# Patient Record
Sex: FEMALE | Race: WHITE | Employment: UNEMPLOYED | URBAN - METROPOLITAN AREA
[De-identification: names, ages, dates, MRNs, and addresses within clinical notes are randomized per-mention and may not be internally consistent; named-entity substitution may affect disease eponyms.]

---

## 2017-03-31 ENCOUNTER — APPOINTMENT (OUTPATIENT)
Dept: GENERAL RADIOLOGY | Facility: HOSPITAL | Age: 57
End: 2017-03-31
Attending: EMERGENCY MEDICINE
Payer: MEDICAID

## 2017-03-31 ENCOUNTER — HOSPITAL ENCOUNTER (EMERGENCY)
Facility: HOSPITAL | Age: 57
Discharge: STILL ADMITTED OR ADMINISTRATIVE | End: 2017-04-01
Attending: EMERGENCY MEDICINE
Payer: MEDICAID

## 2017-03-31 ENCOUNTER — APPOINTMENT (OUTPATIENT)
Dept: CT IMAGING | Facility: HOSPITAL | Age: 57
End: 2017-03-31
Attending: EMERGENCY MEDICINE
Payer: MEDICAID

## 2017-03-31 DIAGNOSIS — R50.9 FEVER, UNSPECIFIED FEVER CAUSE: Primary | ICD-10-CM

## 2017-03-31 DIAGNOSIS — D64.9 ANEMIA, UNSPECIFIED TYPE: ICD-10-CM

## 2017-03-31 DIAGNOSIS — E86.0 DEHYDRATION: ICD-10-CM

## 2017-03-31 PROCEDURE — 96374 THER/PROPH/DIAG INJ IV PUSH: CPT

## 2017-03-31 PROCEDURE — 99285 EMERGENCY DEPT VISIT HI MDM: CPT

## 2017-03-31 PROCEDURE — 96361 HYDRATE IV INFUSION ADD-ON: CPT

## 2017-03-31 PROCEDURE — 82272 OCCULT BLD FECES 1-3 TESTS: CPT

## 2017-03-31 PROCEDURE — 93005 ELECTROCARDIOGRAM TRACING: CPT

## 2017-03-31 PROCEDURE — 80053 COMPREHEN METABOLIC PANEL: CPT | Performed by: EMERGENCY MEDICINE

## 2017-03-31 PROCEDURE — 74177 CT ABD & PELVIS W/CONTRAST: CPT

## 2017-03-31 PROCEDURE — 83690 ASSAY OF LIPASE: CPT | Performed by: EMERGENCY MEDICINE

## 2017-03-31 PROCEDURE — 85025 COMPLETE CBC W/AUTO DIFF WBC: CPT | Performed by: EMERGENCY MEDICINE

## 2017-03-31 PROCEDURE — 87040 BLOOD CULTURE FOR BACTERIA: CPT | Performed by: EMERGENCY MEDICINE

## 2017-03-31 PROCEDURE — 93010 ELECTROCARDIOGRAM REPORT: CPT

## 2017-03-31 PROCEDURE — 36415 COLL VENOUS BLD VENIPUNCTURE: CPT

## 2017-03-31 PROCEDURE — 71260 CT THORAX DX C+: CPT

## 2017-03-31 PROCEDURE — 71010 XR CHEST AP PORTABLE  (CPT=71010): CPT

## 2017-03-31 PROCEDURE — 83605 ASSAY OF LACTIC ACID: CPT | Performed by: EMERGENCY MEDICINE

## 2017-03-31 RX ORDER — METHADONE HYDROCHLORIDE 10 MG/1
60 TABLET ORAL NIGHTLY
Status: ON HOLD | COMMUNITY
End: 2019-08-02

## 2017-03-31 RX ORDER — TIZANIDINE 4 MG/1
8 TABLET ORAL 3 TIMES DAILY
COMMUNITY

## 2017-03-31 RX ORDER — AMITRIPTYLINE HYDROCHLORIDE 150 MG/1
150 TABLET, FILM COATED ORAL NIGHTLY
COMMUNITY

## 2017-04-01 VITALS
RESPIRATION RATE: 16 BRPM | WEIGHT: 99 LBS | TEMPERATURE: 99 F | BODY MASS INDEX: 22.91 KG/M2 | SYSTOLIC BLOOD PRESSURE: 140 MMHG | HEIGHT: 55 IN | HEART RATE: 85 BPM | OXYGEN SATURATION: 97 % | DIASTOLIC BLOOD PRESSURE: 84 MMHG

## 2017-04-01 PROCEDURE — 87999 UNLISTED MICROBIOLOGY PX: CPT

## 2017-04-01 PROCEDURE — 87086 URINE CULTURE/COLONY COUNT: CPT | Performed by: EMERGENCY MEDICINE

## 2017-04-01 PROCEDURE — 87486 CHLMYD PNEUM DNA AMP PROBE: CPT | Performed by: EMERGENCY MEDICINE

## 2017-04-01 PROCEDURE — 87798 DETECT AGENT NOS DNA AMP: CPT | Performed by: EMERGENCY MEDICINE

## 2017-04-01 PROCEDURE — C9113 INJ PANTOPRAZOLE SODIUM, VIA: HCPCS | Performed by: EMERGENCY MEDICINE

## 2017-04-01 PROCEDURE — 87581 M.PNEUMON DNA AMP PROBE: CPT | Performed by: EMERGENCY MEDICINE

## 2017-04-01 PROCEDURE — 83605 ASSAY OF LACTIC ACID: CPT | Performed by: EMERGENCY MEDICINE

## 2017-04-01 PROCEDURE — 81001 URINALYSIS AUTO W/SCOPE: CPT | Performed by: EMERGENCY MEDICINE

## 2017-04-01 PROCEDURE — 87633 RESP VIRUS 12-25 TARGETS: CPT | Performed by: EMERGENCY MEDICINE

## 2017-04-01 RX ORDER — ACETAMINOPHEN 160 MG/5ML
15 SOLUTION ORAL ONCE
Status: COMPLETED | OUTPATIENT
Start: 2017-04-01 | End: 2017-04-01

## 2017-04-01 NOTE — ED NOTES
Aroldo Carlson called back from [employer]. Wanted to update us that the original transport team would not be able to come. New ETA of 30-45 min.

## 2017-04-01 NOTE — ED NOTES
Report called to anaid of [facility]. Pt going to room 426.

## 2017-04-01 NOTE — ED NOTES
Spoke with MTOLIVIER. Trip# [id number]. Spoke with Ruthie Buchanan at [facility].  ETA 10-15 min

## 2017-04-01 NOTE — ED NOTES
Patient updated with plan of care. Given orange juice upon request. Transportation to [facility] to be here in 10-15 minutes.

## 2017-04-01 NOTE — ED INITIAL ASSESSMENT (HPI)
Pt reports falling from her ottoman a few hours ago. Pt denies hitting her head. No LOC. Pt reports having a low blood count and fever since yesterday. She states her doctor wanted her to be seen.  Pt is awake, alert, and oriented and denies any pain at [facility]

## 2018-01-23 ENCOUNTER — APPOINTMENT (OUTPATIENT)
Dept: CT IMAGING | Facility: HOSPITAL | Age: 58
End: 2018-01-23
Attending: EMERGENCY MEDICINE
Payer: MEDICAID

## 2018-01-23 ENCOUNTER — HOSPITAL ENCOUNTER (EMERGENCY)
Facility: HOSPITAL | Age: 58
Discharge: HOME | End: 2018-01-23
Attending: EMERGENCY MEDICINE
Payer: MEDICAID

## 2018-01-23 VITALS
RESPIRATION RATE: 18 BRPM | DIASTOLIC BLOOD PRESSURE: 47 MMHG | TEMPERATURE: 98 F | OXYGEN SATURATION: 97 % | SYSTOLIC BLOOD PRESSURE: 85 MMHG | HEART RATE: 103 BPM
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
## 2018-01-23 DIAGNOSIS — F11.29 OPIOID DEPENDENCE WITH OPIOID-INDUCED DISORDER (HCC): ICD-10-CM

## 2018-01-23 LAB
ALBUMIN SERPL-MCNC: 3.5 G/DL (ref 3.5–4.8)
ALP LIVER SERPL-CCNC: 109 U/L (ref 46–118)
ALT SERPL-CCNC: 14 U/L (ref 14–54)
AST SERPL-CCNC: 21 U/L (ref 15–41)
BASOPHILS # BLD AUTO: 0.06 X10(3) UL (ref 0–0.1)
BASOPHILS NFR BLD AUTO: 0.8 %
BILIRUB SERPL-MCNC: 0.2 MG/DL (ref 0.1–2)
BUN BLD-MCNC: 27 MG/DL (ref 8–20)
CALCIUM BLD-MCNC: 9.5 MG/DL (ref 8.3–10.3)
CHLORIDE: 101 MMOL/L (ref 101–111)
CO2: 25 MMOL/L (ref 22–32)
CREAT BLD-MCNC: 0.52 MG/DL (ref 0.55–1.02)
EOSINOPHIL # BLD AUTO: 0.42 X10(3) UL (ref 0–0.3)
EOSINOPHIL NFR BLD AUTO: 5.6 %
ERYTHROCYTE [DISTWIDTH] IN BLOOD BY AUTOMATED COUNT: 14.3 % (ref 11.5–16)
GLUCOSE BLD-MCNC: 69 MG/DL (ref 70–99)
HCT VFR BLD AUTO: 40.7 % (ref 34–50)
HGB BLD-MCNC: 13.4 G/DL (ref 12–16)
IMMATURE GRANULOCYTE COUNT: 0.04 X10(3) UL (ref 0–1)
IMMATURE GRANULOCYTE RATIO %: 0.5 %
LYMPHOCYTES # BLD AUTO: 2.4 X10(3) UL (ref 0.9–4)
LYMPHOCYTES NFR BLD AUTO: 31.9 %
M PROTEIN MFR SERPL ELPH: 7.5 G/DL (ref 6.1–8.3)
MCH RBC QN AUTO: 30.7 PG (ref 27–33.2)
MCHC RBC AUTO-ENTMCNC: 32.9 G/DL (ref 31–37)
MCV RBC AUTO: 93.3 FL (ref 81–100)
MONOCYTES # BLD AUTO: 0.36 X10(3) UL (ref 0.1–0.6)
MONOCYTES NFR BLD AUTO: 4.8 %
NEUTROPHIL ABS PRELIM: 4.25 X10 (3) UL (ref 1.3–6.7)
NEUTROPHILS # BLD AUTO: 4.25 X10(3) UL (ref 1.3–6.7)
NEUTROPHILS NFR BLD AUTO: 56.4 %
PLATELET # BLD AUTO: 245 10(3)UL (ref 150–450)
POTASSIUM SERPL-SCNC: 3.7 MMOL/L (ref 3.6–5.1)
RBC # BLD AUTO: 4.36 X10(6)UL (ref 3.8–5.1)
RED CELL DISTRIBUTION WIDTH-SD: 48.8 FL (ref 35.1–46.3)
SODIUM SERPL-SCNC: 136 MMOL/L (ref 136–144)
WBC # BLD AUTO: 7.5 X10(3) UL (ref 4–13)

## 2018-01-23 PROCEDURE — 85025 COMPLETE CBC W/AUTO DIFF WBC: CPT | Performed by: EMERGENCY MEDICINE

## 2018-01-23 PROCEDURE — 96360 HYDRATION IV INFUSION INIT: CPT

## 2018-01-23 PROCEDURE — 80053 COMPREHEN METABOLIC PANEL: CPT | Performed by: EMERGENCY MEDICINE

## 2018-01-23 PROCEDURE — 70450 CT HEAD/BRAIN W/O DYE: CPT | Performed by: EMERGENCY MEDICINE

## 2018-01-23 PROCEDURE — 99284 EMERGENCY DEPT VISIT MOD MDM: CPT

## 2018-01-23 PROCEDURE — 96361 HYDRATE IV INFUSION ADD-ON: CPT

## 2018-01-23 RX ORDER — GABAPENTIN 100 MG/1
200 CAPSULE ORAL 3 TIMES DAILY
Status: ON HOLD | COMMUNITY
End: 2019-08-02

## 2018-01-23 RX ORDER — FENTANYL 100 UG/H
1 PATCH TRANSDERMAL
Status: ON HOLD | COMMUNITY
End: 2019-08-09

## 2018-01-23 NOTE — ED PROVIDER NOTES
Patient Seen in: [facility] Emergency Department    History   Patient presents with:  Fall (musculoskeletal, neurologic)    Stated Complaint: fall, head and neck pain    HPI    Patient is a 59-year-old woman who comes by paramedics after a fall.   Pa cervical spine tenderness  Lungs: No tachypnea. Lungs clear to auscultation bilaterally without rales/rhonchi. Equal breath sounds bilaterally  Cardiac: No tachycardia. No murmurs. Regular rate and rhythm. Abdomen: Soft and nontender throughout.   No r evaluate. Labs reviewed. Patient is dehydrated here. She was hydrated with normal saline. She is given a tray. Patient would prefer to stay at home. Says she has helped though the help has been the last 2 days. Talked to the son.   Will have case m

## 2018-01-23 NOTE — ED NOTES
ER Phys confirmed Pt safe for for D/C, called Pt's son Rudi Sommer, who advised he would come  Pt from ER. ER Phys made aware of same.    Ordered Pt food tray

## 2018-01-23 NOTE — ED INITIAL ASSESSMENT (HPI)
Patient arrives via EMS after fall. Patient states she lost her balance and hit the back of her head. No LOC, not on blood thinners. Patient refused c collar from medics.  Patient also has two 100 mcg fentanyl patches in place, also taking methadone but ran

## 2018-01-23 NOTE — ED NOTES
ER Phys spoke to Pt son regarding assisted living care options for Pt. Pt son stated he believed PT would benefit from assisted living. ER Phys spoke to Pt who stated she would be willing to go to assisted living if her insurance allowed it.

## 2018-01-24 NOTE — ED NOTES
Pt ate half her meal tray, half her hamburger, both micki chip cookies, all of her milk and some crackers

## 2018-01-24 NOTE — ED NOTES
Please note that case management made aware of Pt needing assistance finding assisted living; ER RN left message on their private voice mail

## 2019-03-19 ENCOUNTER — APPOINTMENT (OUTPATIENT)
Dept: CT IMAGING | Facility: HOSPITAL | Age: 59
End: 2019-03-19
Attending: EMERGENCY MEDICINE
Payer: MEDICAID

## 2019-03-19 ENCOUNTER — HOSPITAL ENCOUNTER (EMERGENCY)
Facility: HOSPITAL | Age: 59
Discharge: HOME | End: 2019-03-19
Attending: EMERGENCY MEDICINE
Payer: MEDICAID

## 2019-03-19 VITALS
HEIGHT: 55 IN | OXYGEN SATURATION: 100 % | SYSTOLIC BLOOD PRESSURE: 128 MMHG | TEMPERATURE: 97 F | BODY MASS INDEX: 22.95 KG/M2 | WEIGHT: 99.19 LBS | RESPIRATION RATE: 13 BRPM | HEART RATE: 78 BPM | DIASTOLIC BLOOD PRESSURE: 84 MMHG
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
## 2019-03-19 LAB
ALBUMIN SERPL-MCNC: 3.5 G/DL (ref 3.4–5)
ALBUMIN/GLOB SERPL: 1 {RATIO} (ref 1–2)
ALP LIVER SERPL-CCNC: 106 U/L (ref 46–118)
ALT SERPL-CCNC: 16 U/L (ref 13–56)
ANION GAP SERPL CALC-SCNC: 3 MMOL/L (ref 0–18)
APTT PPP: 31.2 SECONDS (ref 26.1–34.6)
AST SERPL-CCNC: 19 U/L (ref 15–37)
ATRIAL RATE: 68 BPM
BASOPHILS # BLD AUTO: 0.04 X10(3) UL (ref 0–0.2)
BASOPHILS NFR BLD AUTO: 0.7 %
BILIRUB SERPL-MCNC: 0.2 MG/DL (ref 0.1–2)
BILIRUB UR QL STRIP.AUTO: NEGATIVE
BUN BLD-MCNC: 9 MG/DL (ref 7–18)
BUN/CREAT SERPL: 25.7 (ref 10–20)
CALCIUM BLD-MCNC: 9.6 MG/DL (ref 8.5–10.1)
CHLORIDE SERPL-SCNC: 107 MMOL/L (ref 98–107)
CLARITY UR REFRACT.AUTO: CLEAR
CO2 SERPL-SCNC: 30 MMOL/L (ref 21–32)
COLOR UR AUTO: YELLOW
CREAT BLD-MCNC: 0.35 MG/DL (ref 0.55–1.02)
DEPRECATED RDW RBC AUTO: 46.9 FL (ref 35.1–46.3)
EOSINOPHIL # BLD AUTO: 0.46 X10(3) UL (ref 0–0.7)
EOSINOPHIL NFR BLD AUTO: 7.5 %
ERYTHROCYTE [DISTWIDTH] IN BLOOD BY AUTOMATED COUNT: 13.8 % (ref 11–15)
GLOBULIN PLAS-MCNC: 3.5 G/DL (ref 2.8–4.4)
GLUCOSE BLD-MCNC: 89 MG/DL (ref 70–99)
GLUCOSE UR STRIP.AUTO-MCNC: NEGATIVE MG/DL
HCT VFR BLD AUTO: 38.7 % (ref 35–48)
HGB BLD-MCNC: 13 G/DL (ref 12–16)
IMM GRANULOCYTES # BLD AUTO: 0.03 X10(3) UL (ref 0–1)
IMM GRANULOCYTES NFR BLD: 0.5 %
INR BLD: 0.91 (ref 0.9–1.1)
KETONES UR STRIP.AUTO-MCNC: NEGATIVE MG/DL
LEUKOCYTE ESTERASE UR QL STRIP.AUTO: NEGATIVE
LYMPHOCYTES # BLD AUTO: 1.51 X10(3) UL (ref 1–4)
LYMPHOCYTES NFR BLD AUTO: 24.6 %
M PROTEIN MFR SERPL ELPH: 7 G/DL (ref 6.4–8.2)
MCH RBC QN AUTO: 31.5 PG (ref 26–34)
MCHC RBC AUTO-ENTMCNC: 33.6 G/DL (ref 31–37)
MCV RBC AUTO: 93.7 FL (ref 80–100)
MONOCYTES # BLD AUTO: 0.38 X10(3) UL (ref 0.1–1)
MONOCYTES NFR BLD AUTO: 6.2 %
NEUTROPHILS # BLD AUTO: 3.71 X10 (3) UL (ref 1.5–7.7)
NEUTROPHILS # BLD AUTO: 3.71 X10(3) UL (ref 1.5–7.7)
NEUTROPHILS NFR BLD AUTO: 60.5 %
NITRITE UR QL STRIP.AUTO: NEGATIVE
OSMOLALITY SERPL CALC.SUM OF ELEC: 288 MOSM/KG (ref 275–295)
P AXIS: 77 DEGREES
P-R INTERVAL: 192 MS
PH UR STRIP.AUTO: 6 [PH] (ref 4.5–8)
PLATELET # BLD AUTO: 172 10(3)UL (ref 150–450)
POTASSIUM SERPL-SCNC: 3.8 MMOL/L (ref 3.5–5.1)
PROT UR STRIP.AUTO-MCNC: NEGATIVE MG/DL
PSA SERPL DL<=0.01 NG/ML-MCNC: 12.6 SECONDS (ref 12.5–14.7)
Q-T INTERVAL: 432 MS
QRS DURATION: 98 MS
QTC CALCULATION (BEZET): 459 MS
R AXIS: 38 DEGREES
RBC # BLD AUTO: 4.13 X10(6)UL (ref 3.8–5.3)
RBC UR QL AUTO: NEGATIVE
SODIUM SERPL-SCNC: 140 MMOL/L (ref 136–145)
SP GR UR STRIP.AUTO: 1.01 (ref 1–1.03)
T AXIS: 62 DEGREES
TROPONIN I SERPL-MCNC: <0.045 NG/ML (ref ?–0.04)
UROBILINOGEN UR STRIP.AUTO-MCNC: <2 MG/DL
VENTRICULAR RATE: 68 BPM
WBC # BLD AUTO: 6.1 X10(3) UL (ref 4–11)

## 2019-03-19 PROCEDURE — 99285 EMERGENCY DEPT VISIT HI MDM: CPT | Performed by: EMERGENCY MEDICINE

## 2019-03-19 PROCEDURE — 70450 CT HEAD/BRAIN W/O DYE: CPT | Performed by: EMERGENCY MEDICINE

## 2019-03-19 PROCEDURE — 93005 ELECTROCARDIOGRAM TRACING: CPT

## 2019-03-19 PROCEDURE — 96360 HYDRATION IV INFUSION INIT: CPT | Performed by: EMERGENCY MEDICINE

## 2019-03-19 PROCEDURE — 85730 THROMBOPLASTIN TIME PARTIAL: CPT | Performed by: EMERGENCY MEDICINE

## 2019-03-19 PROCEDURE — 80053 COMPREHEN METABOLIC PANEL: CPT | Performed by: EMERGENCY MEDICINE

## 2019-03-19 PROCEDURE — 84484 ASSAY OF TROPONIN QUANT: CPT | Performed by: EMERGENCY MEDICINE

## 2019-03-19 PROCEDURE — 85025 COMPLETE CBC W/AUTO DIFF WBC: CPT | Performed by: EMERGENCY MEDICINE

## 2019-03-19 PROCEDURE — 81003 URINALYSIS AUTO W/O SCOPE: CPT | Performed by: EMERGENCY MEDICINE

## 2019-03-19 PROCEDURE — 96361 HYDRATE IV INFUSION ADD-ON: CPT | Performed by: EMERGENCY MEDICINE

## 2019-03-19 PROCEDURE — 85610 PROTHROMBIN TIME: CPT | Performed by: EMERGENCY MEDICINE

## 2019-03-19 PROCEDURE — 93010 ELECTROCARDIOGRAM REPORT: CPT | Performed by: EMERGENCY MEDICINE

## 2019-03-19 RX ORDER — SODIUM CHLORIDE 9 MG/ML
125 INJECTION, SOLUTION INTRAVENOUS CONTINUOUS
Status: DISCONTINUED | OUTPATIENT
Start: 2019-03-19 | End: 2019-03-19

## 2019-03-19 RX ORDER — SENNOSIDES 8.6 MG
8.6 TABLET ORAL 2 TIMES DAILY
COMMUNITY

## 2019-03-19 RX ORDER — METOPROLOL SUCCINATE 25 MG/1
25 TABLET, EXTENDED RELEASE ORAL DAILY
Status: ON HOLD | COMMUNITY
End: 2019-08-06

## 2019-03-19 NOTE — ED PROVIDER NOTES
Patient Seen in: [facility] Emergency Department    History   Patient presents with:  Fall (musculoskeletal, neurologic)    Stated Complaint: fall    DILLAN Cisneros Amish is a pleasant 42-year-old female coming with complaints of fall.   She has had 4 falls within normal limits   PROTHROMBIN TIME (PT) - Normal   PTT, ACTIVATED - Normal   TROPONIN I - Normal   CBC WITH DIFFERENTIAL WITH PLATELET    Narrative: The following orders were created for panel order CBC WITH DIFFERENTIAL WITH PLATELET.   Procedure

## 2019-03-19 NOTE — ED INITIAL ASSESSMENT (HPI)
Pt has been having multiple fall,  Medics called 4 times yesterday for assistance. Soial Shan last night and has been on floor since.   Pain in rt leg when trying to ambulate

## 2019-08-01 ENCOUNTER — HOSPITAL ENCOUNTER (INPATIENT)
Facility: HOSPITAL | Age: 59
LOS: 2 days | Discharge: HOME HEALTH | DRG: 083 | End: 2019-08-03
Attending: EMERGENCY MEDICINE | Admitting: HOSPITALIST
Payer: MEDICAID

## 2019-08-01 ENCOUNTER — APPOINTMENT (OUTPATIENT)
Dept: CT IMAGING | Facility: HOSPITAL | Age: 59
DRG: 083 | End: 2019-08-01
Attending: NURSE PRACTITIONER
Payer: MEDICAID

## 2019-08-01 ENCOUNTER — APPOINTMENT (OUTPATIENT)
Dept: CT IMAGING | Facility: HOSPITAL | Age: 59
DRG: 083 | End: 2019-08-01
Attending: EMERGENCY MEDICINE
Payer: MEDICAID

## 2019-08-01 DIAGNOSIS — M08.00 JUVENILE RHEUMATOID ARTHRITIS (HCC): ICD-10-CM

## 2019-08-01 DIAGNOSIS — R53.1 WEAKNESS GENERALIZED: Primary | ICD-10-CM

## 2019-08-01 DIAGNOSIS — E34.3 DWARFISM: ICD-10-CM

## 2019-08-01 DIAGNOSIS — S06.5X9A ACUTE SUBDURAL HEMATOMA (HCC): ICD-10-CM

## 2019-08-01 DIAGNOSIS — R26.81 GAIT INSTABILITY: ICD-10-CM

## 2019-08-01 DIAGNOSIS — E87.6 HYPOKALEMIA: ICD-10-CM

## 2019-08-01 DIAGNOSIS — R29.6 FREQUENT FALLS: ICD-10-CM

## 2019-08-01 PROBLEM — I10 ESSENTIAL HYPERTENSION: Status: ACTIVE | Noted: 2019-08-01

## 2019-08-01 LAB
ALBUMIN SERPL-MCNC: 3.7 G/DL (ref 3.4–5)
ALBUMIN/GLOB SERPL: 1.1 {RATIO} (ref 1–2)
ALP LIVER SERPL-CCNC: 101 U/L (ref 46–118)
ALT SERPL-CCNC: 14 U/L (ref 13–56)
AMPHET UR QL SCN: NEGATIVE
ANION GAP SERPL CALC-SCNC: 2 MMOL/L (ref 0–18)
ANION GAP SERPL CALC-SCNC: 6 MMOL/L (ref 0–18)
APTT PPP: 28.6 SECONDS (ref 25.4–36.1)
AST SERPL-CCNC: 19 U/L (ref 15–37)
ATRIAL RATE: 75 BPM
BARBITURATES UR QL SCN: NEGATIVE
BASOPHILS # BLD AUTO: 0.05 X10(3) UL (ref 0–0.2)
BASOPHILS # BLD AUTO: 0.07 X10(3) UL (ref 0–0.2)
BASOPHILS NFR BLD AUTO: 0.7 %
BASOPHILS NFR BLD AUTO: 1 %
BENZODIAZ UR QL SCN: NEGATIVE
BILIRUB SERPL-MCNC: 0.5 MG/DL (ref 0.1–2)
BILIRUB UR QL STRIP.AUTO: NEGATIVE
BUN BLD-MCNC: 7 MG/DL (ref 7–18)
BUN BLD-MCNC: 8 MG/DL (ref 7–18)
BUN/CREAT SERPL: 17.4 (ref 10–20)
BUN/CREAT SERPL: 19.4 (ref 10–20)
CALCIUM BLD-MCNC: 9 MG/DL (ref 8.5–10.1)
CALCIUM BLD-MCNC: 9.5 MG/DL (ref 8.5–10.1)
CANNABINOIDS UR QL SCN: NEGATIVE
CHLORIDE SERPL-SCNC: 105 MMOL/L (ref 98–112)
CHLORIDE SERPL-SCNC: 108 MMOL/L (ref 98–112)
CHOLEST SMN-MCNC: 195 MG/DL (ref ?–200)
CLARITY UR REFRACT.AUTO: CLEAR
CO2 SERPL-SCNC: 29 MMOL/L (ref 21–32)
CO2 SERPL-SCNC: 33 MMOL/L (ref 21–32)
COCAINE UR QL: NEGATIVE
COLOR UR AUTO: YELLOW
CREAT BLD-MCNC: 0.36 MG/DL (ref 0.55–1.02)
CREAT BLD-MCNC: 0.46 MG/DL (ref 0.55–1.02)
CREAT UR-SCNC: 42.4 MG/DL
DEPRECATED RDW RBC AUTO: 49.4 FL (ref 35.1–46.3)
DEPRECATED RDW RBC AUTO: 49.6 FL (ref 35.1–46.3)
EOSINOPHIL # BLD AUTO: 0.53 X10(3) UL (ref 0–0.7)
EOSINOPHIL # BLD AUTO: 0.58 X10(3) UL (ref 0–0.7)
EOSINOPHIL NFR BLD AUTO: 7.6 %
EOSINOPHIL NFR BLD AUTO: 7.8 %
ERYTHROCYTE [DISTWIDTH] IN BLOOD BY AUTOMATED COUNT: 13.9 % (ref 11–15)
ERYTHROCYTE [DISTWIDTH] IN BLOOD BY AUTOMATED COUNT: 14 % (ref 11–15)
EST. AVERAGE GLUCOSE BLD GHB EST-MCNC: 117 MG/DL (ref 68–126)
ETHANOL UR-MCNC: NEGATIVE MG/DL
GLOBULIN PLAS-MCNC: 3.4 G/DL (ref 2.8–4.4)
GLUCOSE BLD-MCNC: 77 MG/DL (ref 70–99)
GLUCOSE BLD-MCNC: 95 MG/DL (ref 70–99)
GLUCOSE UR STRIP.AUTO-MCNC: NEGATIVE MG/DL
HBA1C MFR BLD HPLC: 5.7 % (ref ?–5.7)
HCT VFR BLD AUTO: 38.3 % (ref 35–48)
HCT VFR BLD AUTO: 40.2 % (ref 35–48)
HDLC SERPL-MCNC: 61 MG/DL (ref 40–59)
HGB BLD-MCNC: 12.3 G/DL (ref 12–16)
HGB BLD-MCNC: 13.1 G/DL (ref 12–16)
IMM GRANULOCYTES # BLD AUTO: 0.02 X10(3) UL (ref 0–1)
IMM GRANULOCYTES # BLD AUTO: 0.02 X10(3) UL (ref 0–1)
IMM GRANULOCYTES NFR BLD: 0.3 %
IMM GRANULOCYTES NFR BLD: 0.3 %
INR BLD: 1.04 (ref 0.9–1.1)
KETONES UR STRIP.AUTO-MCNC: NEGATIVE MG/DL
LDLC SERPL CALC-MCNC: 116 MG/DL (ref ?–100)
LEUKOCYTE ESTERASE UR QL STRIP.AUTO: NEGATIVE
LYMPHOCYTES # BLD AUTO: 1.74 X10(3) UL (ref 1–4)
LYMPHOCYTES # BLD AUTO: 2.02 X10(3) UL (ref 1–4)
LYMPHOCYTES NFR BLD AUTO: 25 %
LYMPHOCYTES NFR BLD AUTO: 27.2 %
M PROTEIN MFR SERPL ELPH: 7.1 G/DL (ref 6.4–8.2)
MCH RBC QN AUTO: 31 PG (ref 26–34)
MCH RBC QN AUTO: 31.4 PG (ref 26–34)
MCHC RBC AUTO-ENTMCNC: 32.1 G/DL (ref 31–37)
MCHC RBC AUTO-ENTMCNC: 32.6 G/DL (ref 31–37)
MCV RBC AUTO: 96.4 FL (ref 80–100)
MCV RBC AUTO: 96.5 FL (ref 80–100)
MONOCYTES # BLD AUTO: 0.39 X10(3) UL (ref 0.1–1)
MONOCYTES # BLD AUTO: 0.49 X10(3) UL (ref 0.1–1)
MONOCYTES NFR BLD AUTO: 5.6 %
MONOCYTES NFR BLD AUTO: 6.6 %
NEUTROPHILS # BLD AUTO: 4.22 X10 (3) UL (ref 1.5–7.7)
NEUTROPHILS # BLD AUTO: 4.22 X10(3) UL (ref 1.5–7.7)
NEUTROPHILS # BLD AUTO: 4.27 X10 (3) UL (ref 1.5–7.7)
NEUTROPHILS # BLD AUTO: 4.27 X10(3) UL (ref 1.5–7.7)
NEUTROPHILS NFR BLD AUTO: 57.4 %
NEUTROPHILS NFR BLD AUTO: 60.5 %
NITRITE UR QL STRIP.AUTO: NEGATIVE
NONHDLC SERPL-MCNC: 134 MG/DL (ref ?–130)
OPIATES UR QL SCN: NEGATIVE
OSMOLALITY SERPL CALC.SUM OF ELEC: 288 MOSM/KG (ref 275–295)
OSMOLALITY SERPL CALC.SUM OF ELEC: 293 MOSM/KG (ref 275–295)
P AXIS: 76 DEGREES
P-R INTERVAL: 186 MS
PCP UR QL SCN: NEGATIVE
PH UR STRIP.AUTO: 5 [PH] (ref 4.5–8)
PLATELET # BLD AUTO: 198 10(3)UL (ref 150–450)
PLATELET # BLD AUTO: 224 10(3)UL (ref 150–450)
POTASSIUM SERPL-SCNC: 3.1 MMOL/L (ref 3.5–5.1)
POTASSIUM SERPL-SCNC: 3.2 MMOL/L (ref 3.5–5.1)
POTASSIUM SERPL-SCNC: 3.8 MMOL/L (ref 3.5–5.1)
PROT UR STRIP.AUTO-MCNC: NEGATIVE MG/DL
PSA SERPL DL<=0.01 NG/ML-MCNC: 14 SECONDS (ref 12.5–14.7)
Q-T INTERVAL: 428 MS
QRS DURATION: 112 MS
QTC CALCULATION (BEZET): 477 MS
R AXIS: 8 DEGREES
RBC # BLD AUTO: 3.97 X10(6)UL (ref 3.8–5.3)
RBC # BLD AUTO: 4.17 X10(6)UL (ref 3.8–5.3)
RBC UR QL AUTO: NEGATIVE
SODIUM SERPL-SCNC: 140 MMOL/L (ref 136–145)
SODIUM SERPL-SCNC: 143 MMOL/L (ref 136–145)
SP GR UR STRIP.AUTO: 1.01 (ref 1–1.03)
T AXIS: 86 DEGREES
TRIGL SERPL-MCNC: 89 MG/DL (ref 30–149)
TROPONIN I SERPL-MCNC: <0.045 NG/ML (ref ?–0.04)
TSI SER-ACNC: 1.14 MIU/ML (ref 0.36–3.74)
UROBILINOGEN UR STRIP.AUTO-MCNC: <2 MG/DL
VENTRICULAR RATE: 75 BPM
VLDLC SERPL CALC-MCNC: 18 MG/DL (ref 0–30)
WBC # BLD AUTO: 7 X10(3) UL (ref 4–11)
WBC # BLD AUTO: 7.4 X10(3) UL (ref 4–11)

## 2019-08-01 PROCEDURE — 99221 1ST HOSP IP/OBS SF/LOW 40: CPT | Performed by: NEUROLOGICAL SURGERY

## 2019-08-01 PROCEDURE — 90792 PSYCH DIAG EVAL W/MED SRVCS: CPT | Performed by: OTHER

## 2019-08-01 PROCEDURE — 70450 CT HEAD/BRAIN W/O DYE: CPT | Performed by: NURSE PRACTITIONER

## 2019-08-01 PROCEDURE — 99291 CRITICAL CARE FIRST HOUR: CPT | Performed by: NURSE PRACTITIONER

## 2019-08-01 PROCEDURE — 70450 CT HEAD/BRAIN W/O DYE: CPT | Performed by: EMERGENCY MEDICINE

## 2019-08-01 PROCEDURE — 99223 1ST HOSP IP/OBS HIGH 75: CPT | Performed by: HOSPITALIST

## 2019-08-01 RX ORDER — LABETALOL HYDROCHLORIDE 5 MG/ML
10 INJECTION, SOLUTION INTRAVENOUS EVERY 10 MIN PRN
Status: DISCONTINUED | OUTPATIENT
Start: 2019-08-01 | End: 2019-08-01

## 2019-08-01 RX ORDER — METHADONE HYDROCHLORIDE 10 MG/1
50 TABLET ORAL 2 TIMES DAILY WITH MEALS
Status: ON HOLD | COMMUNITY
End: 2019-08-09

## 2019-08-01 RX ORDER — SODIUM PHOSPHATE, DIBASIC AND SODIUM PHOSPHATE, MONOBASIC 7; 19 G/133ML; G/133ML
1 ENEMA RECTAL ONCE AS NEEDED
Status: DISCONTINUED | OUTPATIENT
Start: 2019-08-01 | End: 2019-08-01

## 2019-08-01 RX ORDER — POTASSIUM CHLORIDE 14.9 MG/ML
20 INJECTION INTRAVENOUS ONCE
Status: DISCONTINUED | OUTPATIENT
Start: 2019-08-01 | End: 2019-08-03

## 2019-08-01 RX ORDER — LABETALOL HYDROCHLORIDE 5 MG/ML
10 INJECTION, SOLUTION INTRAVENOUS EVERY 10 MIN PRN
Status: DISCONTINUED | OUTPATIENT
Start: 2019-08-01 | End: 2019-08-03

## 2019-08-01 RX ORDER — MORPHINE SULFATE 4 MG/ML
1 INJECTION, SOLUTION INTRAMUSCULAR; INTRAVENOUS EVERY 2 HOUR PRN
Status: DISCONTINUED | OUTPATIENT
Start: 2019-08-01 | End: 2019-08-01

## 2019-08-01 RX ORDER — CALCITRIOL 0.5 UG/1
0.5 CAPSULE, LIQUID FILLED ORAL DAILY
COMMUNITY

## 2019-08-01 RX ORDER — OMEPRAZOLE 20 MG/1
20 CAPSULE, DELAYED RELEASE ORAL
COMMUNITY

## 2019-08-01 RX ORDER — LORAZEPAM 2 MG/ML
1 INJECTION INTRAMUSCULAR
Status: DISCONTINUED | OUTPATIENT
Start: 2019-08-01 | End: 2019-08-01

## 2019-08-01 RX ORDER — TIZANIDINE 4 MG/1
8 TABLET ORAL 3 TIMES DAILY PRN
Status: DISCONTINUED | OUTPATIENT
Start: 2019-08-01 | End: 2019-08-02

## 2019-08-01 RX ORDER — SODIUM CHLORIDE 9 MG/ML
INJECTION, SOLUTION INTRAVENOUS CONTINUOUS
Status: DISCONTINUED | OUTPATIENT
Start: 2019-08-01 | End: 2019-08-01

## 2019-08-01 RX ORDER — ONDANSETRON 2 MG/ML
4 INJECTION INTRAMUSCULAR; INTRAVENOUS EVERY 6 HOURS PRN
Status: DISCONTINUED | OUTPATIENT
Start: 2019-08-01 | End: 2019-08-03

## 2019-08-01 RX ORDER — ACETAMINOPHEN 650 MG/1
650 SUPPOSITORY RECTAL EVERY 4 HOURS PRN
Status: DISCONTINUED | OUTPATIENT
Start: 2019-08-01 | End: 2019-08-03

## 2019-08-01 RX ORDER — CALCITRIOL 0.25 UG/1
0.25 CAPSULE, LIQUID FILLED ORAL DAILY
Status: ON HOLD | COMMUNITY
End: 2019-08-06

## 2019-08-01 RX ORDER — SODIUM PHOSPHATE, DIBASIC AND SODIUM PHOSPHATE, MONOBASIC 7; 19 G/133ML; G/133ML
1 ENEMA RECTAL ONCE AS NEEDED
Status: DISCONTINUED | OUTPATIENT
Start: 2019-08-01 | End: 2019-08-03

## 2019-08-01 RX ORDER — SODIUM CHLORIDE 9 MG/ML
INJECTION, SOLUTION INTRAVENOUS CONTINUOUS
Status: DISCONTINUED | OUTPATIENT
Start: 2019-08-01 | End: 2019-08-03

## 2019-08-01 RX ORDER — POLYETHYLENE GLYCOL 3350 17 G/17G
17 POWDER, FOR SOLUTION ORAL DAILY PRN
Status: DISCONTINUED | OUTPATIENT
Start: 2019-08-01 | End: 2019-08-03

## 2019-08-01 RX ORDER — HYDROMORPHONE HYDROCHLORIDE 1 MG/ML
0.5 INJECTION, SOLUTION INTRAMUSCULAR; INTRAVENOUS; SUBCUTANEOUS EVERY 4 HOURS PRN
Status: DISCONTINUED | OUTPATIENT
Start: 2019-08-01 | End: 2019-08-01

## 2019-08-01 RX ORDER — ACETAMINOPHEN 325 MG/1
650 TABLET ORAL EVERY 4 HOURS PRN
Status: DISCONTINUED | OUTPATIENT
Start: 2019-08-01 | End: 2019-08-03

## 2019-08-01 RX ORDER — FENTANYL 50 UG/H
1 PATCH TRANSDERMAL
Status: DISCONTINUED | OUTPATIENT
Start: 2019-08-01 | End: 2019-08-01

## 2019-08-01 RX ORDER — PHENYLEPHRINE HCL IN 0.9% NACL 50MG/250ML
PLASTIC BAG, INJECTION (ML) INTRAVENOUS CONTINUOUS PRN
Status: DISCONTINUED | OUTPATIENT
Start: 2019-08-01 | End: 2019-08-01

## 2019-08-01 RX ORDER — POLYETHYLENE GLYCOL 3350 17 G/17G
17 POWDER, FOR SOLUTION ORAL DAILY PRN
Status: DISCONTINUED | OUTPATIENT
Start: 2019-08-01 | End: 2019-08-01

## 2019-08-01 RX ORDER — METOCLOPRAMIDE HYDROCHLORIDE 5 MG/ML
10 INJECTION INTRAMUSCULAR; INTRAVENOUS EVERY 8 HOURS PRN
Status: DISCONTINUED | OUTPATIENT
Start: 2019-08-01 | End: 2019-08-01

## 2019-08-01 RX ORDER — MORPHINE SULFATE 4 MG/ML
2 INJECTION, SOLUTION INTRAMUSCULAR; INTRAVENOUS EVERY 2 HOUR PRN
Status: DISCONTINUED | OUTPATIENT
Start: 2019-08-01 | End: 2019-08-01

## 2019-08-01 RX ORDER — METOCLOPRAMIDE HYDROCHLORIDE 5 MG/ML
10 INJECTION INTRAMUSCULAR; INTRAVENOUS EVERY 8 HOURS PRN
Status: DISCONTINUED | OUTPATIENT
Start: 2019-08-01 | End: 2019-08-03

## 2019-08-01 RX ORDER — ACETAMINOPHEN 325 MG/1
650 TABLET ORAL EVERY 4 HOURS PRN
Status: DISCONTINUED | OUTPATIENT
Start: 2019-08-01 | End: 2019-08-01

## 2019-08-01 RX ORDER — SENNOSIDES 8.6 MG
17.2 TABLET ORAL NIGHTLY
Status: DISCONTINUED | OUTPATIENT
Start: 2019-08-01 | End: 2019-08-03

## 2019-08-01 RX ORDER — FENTANYL 100 UG/H
1 PATCH TRANSDERMAL
Status: DISCONTINUED | OUTPATIENT
Start: 2019-08-01 | End: 2019-08-03

## 2019-08-01 RX ORDER — BISACODYL 10 MG
10 SUPPOSITORY, RECTAL RECTAL
Status: DISCONTINUED | OUTPATIENT
Start: 2019-08-01 | End: 2019-08-01

## 2019-08-01 RX ORDER — ACETAMINOPHEN 650 MG/1
650 SUPPOSITORY RECTAL EVERY 4 HOURS PRN
Status: DISCONTINUED | OUTPATIENT
Start: 2019-08-01 | End: 2019-08-01

## 2019-08-01 RX ORDER — ACETAMINOPHEN 325 MG/1
650 TABLET ORAL EVERY 6 HOURS PRN
Status: DISCONTINUED | OUTPATIENT
Start: 2019-08-01 | End: 2019-08-01

## 2019-08-01 RX ORDER — GABAPENTIN 100 MG/1
100 CAPSULE ORAL 3 TIMES DAILY
Status: DISCONTINUED | OUTPATIENT
Start: 2019-08-01 | End: 2019-08-03

## 2019-08-01 RX ORDER — METHADONE HYDROCHLORIDE 10 MG/1
50 TABLET ORAL EVERY MORNING
Status: ON HOLD | COMMUNITY
End: 2019-08-02

## 2019-08-01 RX ORDER — DIVALPROEX SODIUM 250 MG/1
250 TABLET, DELAYED RELEASE ORAL EVERY 12 HOURS SCHEDULED
Status: DISCONTINUED | OUTPATIENT
Start: 2019-08-01 | End: 2019-08-01

## 2019-08-01 RX ORDER — POTASSIUM CHLORIDE 20 MEQ/1
40 TABLET, EXTENDED RELEASE ORAL ONCE
Status: COMPLETED | OUTPATIENT
Start: 2019-08-01 | End: 2019-08-01

## 2019-08-01 RX ORDER — METHADONE HYDROCHLORIDE 10 MG/1
50 TABLET ORAL 2 TIMES DAILY
Status: DISCONTINUED | OUTPATIENT
Start: 2019-08-01 | End: 2019-08-03

## 2019-08-01 RX ORDER — METHADONE HYDROCHLORIDE 10 MG/1
60 TABLET ORAL NIGHTLY
Status: DISCONTINUED | OUTPATIENT
Start: 2019-08-01 | End: 2019-08-03

## 2019-08-01 RX ORDER — MORPHINE SULFATE 4 MG/ML
4 INJECTION, SOLUTION INTRAMUSCULAR; INTRAVENOUS EVERY 30 MIN PRN
Status: DISCONTINUED | OUTPATIENT
Start: 2019-08-01 | End: 2019-08-01

## 2019-08-01 RX ORDER — DOCUSATE SODIUM 100 MG/1
100 CAPSULE, LIQUID FILLED ORAL 2 TIMES DAILY
Status: DISCONTINUED | OUTPATIENT
Start: 2019-08-01 | End: 2019-08-03

## 2019-08-01 RX ORDER — SENNOSIDES 8.6 MG
17.2 TABLET ORAL NIGHTLY
Status: DISCONTINUED | OUTPATIENT
Start: 2019-08-01 | End: 2019-08-01

## 2019-08-01 RX ORDER — ONDANSETRON 2 MG/ML
4 INJECTION INTRAMUSCULAR; INTRAVENOUS EVERY 6 HOURS PRN
Status: DISCONTINUED | OUTPATIENT
Start: 2019-08-01 | End: 2019-08-01

## 2019-08-01 RX ORDER — FAMOTIDINE 20 MG/1
20 TABLET ORAL 2 TIMES DAILY
Status: DISCONTINUED | OUTPATIENT
Start: 2019-08-01 | End: 2019-08-01

## 2019-08-01 RX ORDER — ONDANSETRON 2 MG/ML
4 INJECTION INTRAMUSCULAR; INTRAVENOUS EVERY 4 HOURS PRN
Status: DISCONTINUED | OUTPATIENT
Start: 2019-08-01 | End: 2019-08-01

## 2019-08-01 RX ORDER — FAMOTIDINE 10 MG/ML
20 INJECTION, SOLUTION INTRAVENOUS 2 TIMES DAILY
Status: DISCONTINUED | OUTPATIENT
Start: 2019-08-01 | End: 2019-08-01

## 2019-08-01 RX ORDER — SODIUM CHLORIDE 9 MG/ML
1000 INJECTION, SOLUTION INTRAVENOUS ONCE
Status: COMPLETED | OUTPATIENT
Start: 2019-08-01 | End: 2019-08-01

## 2019-08-01 RX ORDER — BISACODYL 10 MG
10 SUPPOSITORY, RECTAL RECTAL
Status: DISCONTINUED | OUTPATIENT
Start: 2019-08-01 | End: 2019-08-03

## 2019-08-01 RX ORDER — LORAZEPAM 2 MG/ML
1 INJECTION INTRAMUSCULAR
Status: DISCONTINUED | OUTPATIENT
Start: 2019-08-01 | End: 2019-08-03

## 2019-08-01 RX ORDER — DOCUSATE SODIUM 100 MG/1
100 CAPSULE, LIQUID FILLED ORAL 2 TIMES DAILY
Status: DISCONTINUED | OUTPATIENT
Start: 2019-08-01 | End: 2019-08-01

## 2019-08-01 RX ORDER — AMITRIPTYLINE HYDROCHLORIDE 50 MG/1
150 TABLET, FILM COATED ORAL NIGHTLY
Status: DISCONTINUED | OUTPATIENT
Start: 2019-08-01 | End: 2019-08-03

## 2019-08-01 RX ORDER — FUROSEMIDE 20 MG/1
20 TABLET ORAL DAILY PRN
COMMUNITY

## 2019-08-01 NOTE — SLP NOTE
ADULT SWALLOWING EVALUATION    ASSESSMENT    ASSESSMENT/OVERALL IMPRESSION:  Order received per protocol; chart reviewed. Pt admitted with c/o falls.  Pt reported multiple falls at home with most recent one falling forward and hitting her face causing epist protocol    Problem List  Principal Problem:    Weakness generalized  Active Problems:    Dwarfism    Juvenile rheumatoid arthritis ([address]    Frequent falls    Gait instability    Hypokalemia    Acute subdural hematoma (HCC)    Essential hypertension      P Plan/Recommendations: Communication evaluation;Aspiration precautions  Number of Visits to Meet Established Goals: 1  Follow Up Needed: Yes  SLP Follow-up Date: 08/02/19    Thank you for your referral.   If you have any questions, please contact Benny Blandon

## 2019-08-01 NOTE — PHYSICAL THERAPY NOTE
PHYSICAL THERAPY EVALUATION - INPATIENT     Room Number: 9319/7263-E  Evaluation Date: 8/1/2019  Type of Evaluation: Initial  Physician Order: PT Eval and Treat    Presenting Problem: SDH  Reason for Therapy: Mobility Dysfunction and Discharge Planni stoop step to support tranfers)  Fall Risk: High fall risk    WEIGHT BEARING RESTRICTION  Weight Bearing Restriction: None                PAIN ASSESSMENT  Ratin  Location: Headache  Management Techniques:  Activity promotion;Repositioning    COGNITION bed?: A Lot   How much help from another person does the patient currently need. ..   -   Moving to and from a bed to a chair (including a wheelchair)?: A Lot   -   Need to walk in hospital room?: A Lot   -   Climbing 3-5 steps with a railing?: Total to pt's size and short stature, rn staff should be able to assist pt w/o significant issues. Exercise/Education Provided:  Bed mobility  Functional activity tolerated  Gait training  Transfer training    Patient End of Session: Up in chair;Needs met; Morgan training;Transfer training;Balance training  Rehab Potential : Good  Frequency (Obs): 5x/week  Number of Visits to Meet Established Goals: 5      CURRENT GOALS    Goal #1 Patient is able to demonstrate supine - sit EOB @ level: minimum assistance     Goal

## 2019-08-01 NOTE — PROGRESS NOTES
08/01/19 1445   Clinical Encounter Type   Visited With Patient   Routine Visit   (Responded to consult)   Continue Visiting   (Encouraged to call  anytime for further support)   Patient Spiritual Encounters   Spiritual Assessment Completed Yes

## 2019-08-01 NOTE — PLAN OF CARE
Patient received from ED at 0300. Patient is drowsy and oriented x4, follows all commands, PETERSON with baseline deficits due to RA, see neuro flowsheet for comprehensive assessments. VSS, NSR on tele. SBP maintained  per orders.  Patient scheduled for re

## 2019-08-01 NOTE — CM/SW NOTE
SW spoke to pt's son today. Pt is 60 yo female, with hx of falls at home. Pt has SDH. Pt lives alone and he is her paid caregiver 30 hours per week. Pt recs Acute [facility]- Alla consulted.     Unable to meet with pt this afternoon, Dr. Yadi Mott to see p

## 2019-08-01 NOTE — H&P
[facility]  History and Physical     Dione Brock Patient Status:  Emergency    1960 MRN [id number]   [address] Attending Jojo Agosto MD   Hosp Day # 0 PCP Chinmay Kennedy     Chief Complaint: AMS    Hi nightly. Disp:  Rfl:    Senna 8.6 MG Oral Tab Take 8.6 mg by mouth 2 (two) times daily. Disp:  Rfl:    Ferrous Gluconate (IRON 27 OR) Take by mouth. Disp:  Rfl:        Review of Systems:   A comprehensive 14 point review of systems was completed.     Pertin 2. Hypertension- Will continue metoprolol with parameters. 3. Juvenile rheumatoid arthritis- Will continue gabapetin. Will resume home pain med if OK with neurosurgery. 4.    Hypertension- Will continue metoprolol with parameters.   5.   Nicotine depen

## 2019-08-01 NOTE — PROGRESS NOTES
Called by ED  Falls  No focal neruo  No seizures    Ct head personally reviewed      Right sided acute on chronic SDH, with approx 3 mm right to left shift    Normal platelets  Pending INR , aptt    Plan:  Admit to icu  q1h neuro  Repeat plain ct head in 6

## 2019-08-01 NOTE — PROGRESS NOTES
[facility]  Neurocritical Care APRN Progress Note    NAME: Cuong Raphael - ROOM: [facility] - MRN: [id number] - Age: 61year old - :1960    History Of Present Illness:  Cuong Raphael is a 61year old female with PMHx significant for j non-tender, bowel sounds active all four quadrants, no masses, no organomegaly  Extremities: Extremities contracted, atraumatic, no cyanosis or edema, capillary refill <3 sec, large calluses on manish feet, long hooked nails  Pulses: 2+ and symmetric all extr eval  -needs outpatient podiatry visit      3. Rheumatoid arthritis with chronic pain  -restart PO medications once able    4.  Tobacco abuse - 1/3 ppd  -encourage cessation  -nicotine patch offered    F/E/N:  NPO  Proph:  -No a/c at this time d/t evidence

## 2019-08-01 NOTE — PAYOR COMM NOTE
--------------  ADMISSION REVIEW     Payor: Abdirahman Cuevas #:  [id number]  Authorization Number: [id number]    Admit date: 8/1/19  Admit time: 0308       Admitting Physician: Doug Nascimento DO  Attending Physician:  MD Dre Bui (!) 140/91   Pulse 78   Temp 98.5 °F (36.9 °C) (Temporal)   Resp 21   Wt 49.9 kg   SpO2 100%   BMI 35.01 kg/m²          Physical Exam    General:  Patient is alert and oriented x3. No acute distress. HEENT: Normocephalic, atraumatic.   Pupils are equall to left midline shift at the level of the frontal horns. Dictated by Dr. Cami Johnson. Consultation obtained with neurosurgery. Patient will be admitted to the neuro ICU. Patient will be kept n.p.o.     [facility] Will continue gabapetin. Will resume home pain med if OK with neurosurgery. 4.    Hypertension- Will continue metoprolol with parameters. 5.   Nicotine dependence- Will order nicotine patch. 6.   Hypokalemia- Will replace per protocol.   Quality:  · DVT

## 2019-08-01 NOTE — VASCULAR ACCESS
Vascular access consult ordered for Extended Dwell PIV placement. The Patient currently has a 20g LAC PIV which is functioning well with good blood return and easy flush; positional at times with arm bent. Brian Enriquez states, \"This one is working fine.  I would

## 2019-08-01 NOTE — PLAN OF CARE
Pt has become increasingly more aggitated stated how will I get home tomorrow if my son is not available to take me?, refused lab draw for repeat potassium, refused new iv access which she refused ivf  Due to discomfort at iv site.  Unable to draw blood fro

## 2019-08-01 NOTE — PLAN OF CARE
Pt care assumed this am, pt very sleepy, taken to CT for brain right at 0730, more alert yet remains drowsy when arrives back to room. Son arrived at bedside around 9 am. Pt medications reviewed with pt.  Per report from night RN, home fentynl patch had bee

## 2019-08-01 NOTE — ED PROVIDER NOTES
Patient Seen in: [facility] Emergency Department    History   Patient presents with:  Fatigue (constitutional, neurologic)    Stated Complaint: generalized weakness    HPI    70-year-old female with a history of juvenile rheumatoid arthritis present Extraocular movements are intact. Oropharynx is clear. Uvula is midline. Tympanic membranes are clear without evidence of injection or perforation. Neck exam: Supple. There is no lymphadenopathy or carotid bruit.   Cardiovascular exam: Regular rate and Rhythm  Reading: NSR. NO ACUTE ST ELEVATION, DEPRESSION. RATE, AXIS, INTERVAL NOTED. AGREE WITH COMPUTER INTERPRETATION.            ED Course as of Aug 01 0302  ------------------------------------------------------------  Time: 08/01 0228  Comment: Flavia Rankin rheumatoid arthritis (HCC)  Frequent falls  Gait instability  Hypokalemia  Acute subdural hematoma ([facility] [address]    Disposition:  Admit  8/1/2019  2:11 am    Follow-up:  No follow-up provider specified.       Medications Prescribed:  Current Discharge Medication Berny Litter

## 2019-08-01 NOTE — CM/SW NOTE
08/01/19 1600   CM/SW Referral Data   Referral Source Physician   Reason for Referral Discharge planning   Informant Patient   Patient Info   Patient's Mental Status Alert;Oriented   Patient's Home Environment Condo/Apt with elevator   Patient lives wit

## 2019-08-01 NOTE — VASCULAR ACCESS
Vascular access consult for placement of a device that will allow for blood draws. Pt currently has a 20g PIV to [address] [place] [address] Avenue that is saline locked, flushes with some tenderness, minimal blood return noted, no signs of infiltration.  I discussed vascular access opti

## 2019-08-01 NOTE — PROGRESS NOTES
PSYCH CONSULT    Date of Admission: 8/1/19  Date of Consult: 8/1/19  Reason for Consultation: Altered mental status, eval for polypharmacy    Impression:  Primary Psychiatric Diagnosis:  Opioid dependence due to chronic joint pain from rheumatoid arthritis

## 2019-08-01 NOTE — ED INITIAL ASSESSMENT (HPI)
Pt called EMS over last few days for lift assist.  Pt states bilateral leg weakness. Pt states she fell forward and struck face on ottoman.

## 2019-08-01 NOTE — CONSULTS
[facility]  Neurocritical Care APRN Progress Note    NAME: Aleshia 67: [id number]/[id number] - MRN: [id number] - Age: 61year old - :1960    History Of Present Illness:  Nette Cartagena is a 61year old female with PMHx significant (Temporal)   Resp 10   Wt 93 lb 14.7 oz (42.6 kg)   SpO2 94%   BMI 29.89 kg/m²    Physical Exam:    General Appearance: Alert, cooperative, frontal HA pain, appears stated age  Neck: Supple, symmetrical, trachea midline, no carotid bruits, adenopathy, or J 8/1/2019  CONCLUSION:  7 mm right hemispheric subdural hematoma associated with approximately 8 mm of midline shift from right to left. There is a minimal anterior falcine 4 mm subdural hematoma. There is no intraparenchymal hemorrhage.   A fracture is no offered    F/E/N:  -NS  -Follow lytes and replete as indicated  -ADAT    Proph:  -No a/c at this time d/t evidence of SDH  -SCD    Dispo:   -Full code  -Neuro ICU for this AM, then transfer to CTU this afternoon if okay with Neurosurgery    Plan of care [facility]

## 2019-08-01 NOTE — CONSULTS
[facility]  Neurosurgery Consult    Shell Romero Patient Status:  Inpatient    1960 MRN [id number]   [facility] 6NE-A Attending Dane Gonzalez MD   Hosp Day # 0 PCP MARIPOSA BENNETT     REASON FOR CONSULTATION:  Right SDH    HISTORY Oral Capsule Delayed Release Take 20 mg by mouth every morning before breakfast. Disp:  Rfl:     Metoprolol Succinate ER 25 MG Oral Tablet 24 Hr Take 25 mg by mouth daily.  Disp:  Rfl:  Taking   fentaNYL 100 MCG/HR Transdermal Patch 72 Hr Place 1 patch onto (REGLAN) injection 10 mg 10 mg Intravenous Q8H PRN   LORazepam (ATIVAN) injection 1 mg 1 mg Intravenous Q15 Min PRN   Senna (SENOKOT) tab 17.2 mg 17.2 mg Oral Nightly   potassium chloride 40 mEq in sodium chloride 0.9% 250 mL IVPB 40 mEq Intravenous Once approximately 8 mm of midline shift to the left from the right subdural hematoma. There is no intraparenchymal hematoma. The basal cisterns are intact.   A skull   fracture is not identified.       =====  CONCLUSION:  7 mm right hemispheric subdural hemat

## 2019-08-01 NOTE — OCCUPATIONAL THERAPY NOTE
OCCUPATIONAL THERAPY EVALUATION - INPATIENT     Room Number: 4268/8176-C  Evaluation Date: 8/1/2019  Type of Evaluation: Initial  Presenting Problem: SDH    Physician Order: IP Consult to Occupational Therapy  Reason for Therapy: ADL/IADL Dysfunction and D head and face that has been going on for hours. Pt would ask about pain medications, and then later state she already received some.      VISION  Current Vision: does not wear glasses  Visual Screen Results:  nystagmus     PERCEPTION  Overall Perception Sta bed was to high for pt to reach floor, therefore had to hop off bed with assistance for safety.  Pt was able to ambulate to the chair using crutches with min assist. Pt required mod assist to sit in chair, chair was also too high for pt and needed to be vance training;Patient/Family education;Patient/Family training;Equipment eval/education;Continued evaluation  Rehab Potential : Good  Frequency (Obs): 5x/week  Number of Visits to Meet Established Goals: 5    ADL Goals   Patient will perform upper body dressing

## 2019-08-01 NOTE — CONSULTS
[facility]  Report of Psychiatric Consultation    Balwindermei Yuenman Patient Status:  Inpatient    1960 MRN [id number]   [facility] 6NE-A Attending Falguni Marie MD   Hosp Day # 0 PCP Rosa Kaur     Date of Admission: 19  Date dependence for treatment of chronic pain from RA was admitted on 8/1/19 after developing a SDH from a fall. Psych consulted because of her hx of anxiety and depression and high doses of opioids.  [place] she is on accurat file.   reports that she has been smoking.  She has never used smokeless tobacco.    Allergies:  No Known Allergies    Medications:    Current Facility-Administered Medications:   •  docusate sodium (COLACE) cap 100 mg, 100 mg, Oral, BID  •  PEG 3350 (BRENNA Constitutional: Positive for malaise/fatigue. Psychiatric/Behavioral: Positive for depression. The patient is nervous/anxious.       Mental Status Exam:    Objective       08/01/19  1400   BP:    Pulse: 88   Resp: 15   Temp:      Appearance: fair groomi hematoma. FINDINGS:       Once again a right-sided subdural hemorrhage is seen involving the right frontal, parietal, and temporal lobes. This measures up to 7 mm in maximal thickness which is unchanged.   Extension into the anterior interhemispher

## 2019-08-02 ENCOUNTER — APPOINTMENT (OUTPATIENT)
Dept: MRI IMAGING | Facility: HOSPITAL | Age: 59
DRG: 083 | End: 2019-08-02
Attending: Other
Payer: MEDICAID

## 2019-08-02 ENCOUNTER — APPOINTMENT (OUTPATIENT)
Dept: CT IMAGING | Facility: HOSPITAL | Age: 59
DRG: 083 | End: 2019-08-02
Attending: Other
Payer: MEDICAID

## 2019-08-02 LAB
BASOPHILS # BLD AUTO: 0.05 X10(3) UL (ref 0–0.2)
BASOPHILS NFR BLD AUTO: 0.5 %
DEPRECATED RDW RBC AUTO: 49.4 FL (ref 35.1–46.3)
EOSINOPHIL # BLD AUTO: 0.26 X10(3) UL (ref 0–0.7)
EOSINOPHIL NFR BLD AUTO: 2.8 %
ERYTHROCYTE [DISTWIDTH] IN BLOOD BY AUTOMATED COUNT: 13.9 % (ref 11–15)
HAV IGM SER QL: 2.2 MG/DL (ref 1.6–2.6)
HCT VFR BLD AUTO: 39 % (ref 35–48)
HGB BLD-MCNC: 12.8 G/DL (ref 12–16)
IMM GRANULOCYTES # BLD AUTO: 0.03 X10(3) UL (ref 0–1)
IMM GRANULOCYTES NFR BLD: 0.3 %
LYMPHOCYTES # BLD AUTO: 1.51 X10(3) UL (ref 1–4)
LYMPHOCYTES NFR BLD AUTO: 16.5 %
MCH RBC QN AUTO: 31.4 PG (ref 26–34)
MCHC RBC AUTO-ENTMCNC: 32.8 G/DL (ref 31–37)
MCV RBC AUTO: 95.8 FL (ref 80–100)
MONOCYTES # BLD AUTO: 0.36 X10(3) UL (ref 0.1–1)
MONOCYTES NFR BLD AUTO: 3.9 %
NEUTROPHILS # BLD AUTO: 6.93 X10 (3) UL (ref 1.5–7.7)
NEUTROPHILS # BLD AUTO: 6.93 X10(3) UL (ref 1.5–7.7)
NEUTROPHILS NFR BLD AUTO: 76 %
PLATELET # BLD AUTO: 196 10(3)UL (ref 150–450)
POTASSIUM SERPL-SCNC: 4.3 MMOL/L (ref 3.5–5.1)
RBC # BLD AUTO: 4.07 X10(6)UL (ref 3.8–5.3)
WBC # BLD AUTO: 9.1 X10(3) UL (ref 4–11)

## 2019-08-02 PROCEDURE — 70496 CT ANGIOGRAPHY HEAD: CPT | Performed by: OTHER

## 2019-08-02 PROCEDURE — 99232 SBSQ HOSP IP/OBS MODERATE 35: CPT | Performed by: OTHER

## 2019-08-02 PROCEDURE — 70498 CT ANGIOGRAPHY NECK: CPT | Performed by: OTHER

## 2019-08-02 PROCEDURE — 99231 SBSQ HOSP IP/OBS SF/LOW 25: CPT | Performed by: NEUROLOGICAL SURGERY

## 2019-08-02 PROCEDURE — 99233 SBSQ HOSP IP/OBS HIGH 50: CPT | Performed by: OTHER

## 2019-08-02 PROCEDURE — 72156 MRI NECK SPINE W/O & W/DYE: CPT | Performed by: OTHER

## 2019-08-02 PROCEDURE — 70450 CT HEAD/BRAIN W/O DYE: CPT | Performed by: OTHER

## 2019-08-02 PROCEDURE — 99233 SBSQ HOSP IP/OBS HIGH 50: CPT | Performed by: HOSPITALIST

## 2019-08-02 RX ORDER — NICOTINE 21 MG/24HR
1 PATCH, TRANSDERMAL 24 HOURS TRANSDERMAL NIGHTLY
Status: DISCONTINUED | OUTPATIENT
Start: 2019-08-02 | End: 2019-08-03

## 2019-08-02 RX ORDER — TIZANIDINE 4 MG/1
4 TABLET ORAL 3 TIMES DAILY
Status: DISCONTINUED | OUTPATIENT
Start: 2019-08-02 | End: 2019-08-03

## 2019-08-02 NOTE — HOME CARE LIAISON
[facility] IS NOT CONTRACTED WITH PNT INSURANCE AND WILL NOT BE ABLE TO PROVIDE [place] ON DC  DAVID KAUR CONTACTED    Martha Diallo
TNL REC'D VERBAL ORDER TO MEET WITH PTNT AND OFFER HH ON DC. TNL MET WITH PTNT AT BEDSIDE TO DISCUSS HH. PTNT AGREEABLE TO HH ON DC.  TNL WILL CHECK TO VERIFY IF WE TAKE HER INSURANCE    THANKS  Delvin Villatoro
moderate assist (50% patients effort)

## 2019-08-02 NOTE — PHYSICAL THERAPY NOTE
PHYSICAL THERAPY TREATMENT NOTE - INPATIENT    Room Number: 2677/7436-E     Session: 1   Number of Visits to Meet Established Goals: 5    Presenting Problem: SDH    Problem List  Principal Problem:    Weakness generalized  Active Problems:    Dwarfism Total(didn't assess)   -   Climbing 3-5 steps with a railing?: Total       AM-PAC Score:  Raw Score: 6   Approx Degree of Impairment: 100%   Standardized Score (AM-PAC Scale): 23.55   CMS Modifier (G-Code): CN    FUNCTIONAL ABILITY STATUS  Gait Assessment training;Balance training  Rehab Potential : Good  Frequency (Obs): 5x/week    CURRENT GOALS     Goal #1 Patient is able to demonstrate supine - sit EOB @ level: minimum assistance      Goal #2 Patient is able to demonstrate transfers EOB to/from [facility] at as

## 2019-08-02 NOTE — PROGRESS NOTES
[facility]  Report of Psychiatric Progress Note    Daniel Paredes Patient Status:  Inpatient    1960 MRN [id number]   [facility] 6NE-A Attending Falguni Marie MD   Hosp Day # 1 PCP Rosa Kaur     Date of Admission: 19  Date and depressed and irritable. She c/o rhinorrhea and nausea and increased muscle aches and joint pains. She hasn't received fentanyl or methadone today. She endorses low energy and mood and motivation. She stays at home most of the time.  Her son helps h magnesium hydroxide (MILK OF MAGNESIA) 400 MG/5ML suspension 30 mL, 30 mL, Oral, Daily PRN  •  bisacodyl (DULCOLAX) rectal suppository 10 mg, 10 mg, Rectal, Daily PRN  •  FLEET ENEMA (FLEET) 7-19 GM/118ML enema 133 mL, 1 enema, Rectal, Once PRN  •  0.9% Na delusions  Perceptions: no hallucinations  Associations: Intact    Orientation: Oriented person, place, time, situation  Attention and Concentration:   fair  Memory:  intact immediate, recent, remote  Language: Intact naming and repetition  [facility]

## 2019-08-02 NOTE — PLAN OF CARE
Pt transferred to room 7609 via bed, on telemetry monitor. Pt more subdued, awake but quiet.  Spoke with Dr Devan Jones and updated him regarding her statement of wanting leave against medical advice, reported there was no reason that she could not leave, order wr

## 2019-08-02 NOTE — CM/SW NOTE
Awaiting MEGHAN taveras.  Pt to have several neuro tests this weekend and will not expected to be d/c'd befroe Monday.

## 2019-08-02 NOTE — PROGRESS NOTES
[address] [facility] with [facility]  8/2/2019    3:40 PM      Addendum:  Came back to talk to patient about her concerns that she continues to be falling and why    Describes events of falling at least once a w

## 2019-08-02 NOTE — SLP NOTE
SPEECH/LANGUAGE/COGNITIVE EVALUATION & DYSPHAGIA THERAPY- INPATIENT    Admission Date: 8/1/2019  Evaluation Date: 08/02/19    Reason for Referral: Stroke protocol    ASSESSMENT & PLAN   ASSESSMENT & IMPRESSION  Patient seen for cognitive communication darcy who verbalized understanding and agreement.       Assessment(s) Administered: SLUMS   Aphasic Depression Rating Scale Administered: Not applicable  Deficits Identified: Attention concentration;Problem solving;Memory         Patient Experiencing Pain: No

## 2019-08-02 NOTE — PLAN OF CARE
Problem: Patient/Family Goals  Goal: Patient/Family Long Term Goal  Description  Patient's Long Term Goal:    Interventions:  -   - See additional Care Plan goals for specific interventions  Outcome: Progressing  Goal: Patient/Family [address] full attention is required  - Consider use of a daily journal to improve memory and reduce confusion related to daily events and orientation  - Allow additional time for processing after asking questions or providing instructions  - Encourage use of eye gl

## 2019-08-02 NOTE — PLAN OF CARE
Problem: Impaired Cognition  Goal: Patient will exhibit improved attention, thought processing and/or memory  Description  Interventions:  - Minimize distractions in the room when full attention is required  - Consider use of a daily journal to improve m

## 2019-08-02 NOTE — PROGRESS NOTES
[facility]  Neurosurgery Progress Note    Maribell Rangel Patient Status:  Inpatient    1960 MRN [id number]   [facility] 7NE-A Attending Mary Mares MD   Hosp Day # 1 PCP Robin Velez     Chief Complaint:  SDH    Subjective:  Pt management per neurology    Yancey Cogan, REMY  [facility]  8/2/2019, 10:02 AM      Patient seen and examined nurse practitioner  Case discussed  Patient with headache today  Discussed head trauma in detail  Okay to discharge from neuro

## 2019-08-02 NOTE — PROGRESS NOTES
[facility]  Progress Note     Ashok Sparks Patient Status:  Inpatient    1960 MRN [id number]   [facility] 7NE-A Attending Alan Corley MD   Hosp Day # 1 PCP Jess Lynn     Chief Complaint: s/p fall    S: Patient denies an 1.04       Recent Labs   Lab 08/01/19  0036   TROP <0.045            Imaging: Imaging data reviewed in Epic.     Medications:   • docusate sodium  100 mg Oral BID   • Senna  17.2 mg Oral Nightly   • potassium chloride  20 mEq Intravenous Once   • fentaNYL

## 2019-08-02 NOTE — OCCUPATIONAL THERAPY NOTE
OCCUPATIONAL THERAPY TREATMENT NOTE - INPATIENT     Room Number: 0918/6123-X  Session: 1   Number of Visits to Meet Established Goals: 5    Presenting Problem: SDH    History related to current admission: Pt is 61year old female admitted on 8/1/2019 with window. Educated the patient about plan of care, importance of mobility in general, and discharge recommendation. Pt clearly told OT and PT that she does not want therapy. Pt commented, \"I have done this before. I went to [facility] in [facility].  I am not hector

## 2019-08-02 NOTE — PROGRESS NOTES
[id number] Meka Gallagher Neurology Progress Note    Margareth Jim Patient Status:  Inpatient    1960 MRN [id number]   [facility] 7NE-A Attending Sumanth Damon MD   Hosp Day # 1 PCP [address] Attending notes:    I have se ([id number])    Result Date: 8/2/2019  CONCLUSION:  1. Essentially stable exam including right cerebral hemisphere subdural hematoma measuring up to 7 mm and right to left midline shift measuring up to 7 mm.    Dictated by: Ana Paula Peñaloza MD on 8/02/2019 at 6 causes would be possible. She is however very anxious to go home and will be followed by neurosurgery. Any alternative plan or deviation from our projected disposition might be changed depending on clinical course or progression.       Beka Boo right eye from prior \"retinal hemorrhage\" , PERRL 3mm brisk, EOMI, no nystagmus, facial sensation intact, face symmetric, tongue midline, shoulder shrug equal, normal hearing, remainder CN GI   Motor: LUE slightly weaker than RUE, BLE with chronic weakne

## 2019-08-02 NOTE — PLAN OF CARE
Assumed care at 1930  A&O x4 forgetful  Neuros Q4h. Sudden R sided HA after trip to . Tylenol given for pain. No relief. All other neuros- no change. R ear ache with ear sounding muffled and like there is water in it. Neuro paged x3. HCT ordered.  Hospi

## 2019-08-03 ENCOUNTER — APPOINTMENT (OUTPATIENT)
Dept: GENERAL RADIOLOGY | Facility: HOSPITAL | Age: 59
DRG: 083 | End: 2019-08-03
Attending: HOSPITALIST
Payer: MEDICAID

## 2019-08-03 VITALS
TEMPERATURE: 98 F | SYSTOLIC BLOOD PRESSURE: 106 MMHG | HEART RATE: 42 BPM | WEIGHT: 107.13 LBS | DIASTOLIC BLOOD PRESSURE: 79 MMHG | BODY MASS INDEX: 34 KG/M2 | OXYGEN SATURATION: 90 % | RESPIRATION RATE: 20 BRPM

## 2019-08-03 LAB
BASOPHILS # BLD AUTO: 0.05 X10(3) UL (ref 0–0.2)
BASOPHILS NFR BLD AUTO: 0.6 %
DEPRECATED RDW RBC AUTO: 51.8 FL (ref 35.1–46.3)
EOSINOPHIL # BLD AUTO: 0.37 X10(3) UL (ref 0–0.7)
EOSINOPHIL NFR BLD AUTO: 4.1 %
ERYTHROCYTE [DISTWIDTH] IN BLOOD BY AUTOMATED COUNT: 14.1 % (ref 11–15)
HAV IGM SER QL: 2.2 MG/DL (ref 1.6–2.6)
HCT VFR BLD AUTO: 39.5 % (ref 35–48)
HGB BLD-MCNC: 12.3 G/DL (ref 12–16)
IMM GRANULOCYTES # BLD AUTO: 0.02 X10(3) UL (ref 0–1)
IMM GRANULOCYTES NFR BLD: 0.2 %
LYMPHOCYTES # BLD AUTO: 1.54 X10(3) UL (ref 1–4)
LYMPHOCYTES NFR BLD AUTO: 17.1 %
MCH RBC QN AUTO: 30.8 PG (ref 26–34)
MCHC RBC AUTO-ENTMCNC: 31.1 G/DL (ref 31–37)
MCV RBC AUTO: 98.8 FL (ref 80–100)
MONOCYTES # BLD AUTO: 0.42 X10(3) UL (ref 0.1–1)
MONOCYTES NFR BLD AUTO: 4.7 %
NEUTROPHILS # BLD AUTO: 6.58 X10 (3) UL (ref 1.5–7.7)
NEUTROPHILS # BLD AUTO: 6.58 X10(3) UL (ref 1.5–7.7)
NEUTROPHILS NFR BLD AUTO: 73.3 %
PLATELET # BLD AUTO: 214 10(3)UL (ref 150–450)
RBC # BLD AUTO: 4 X10(6)UL (ref 3.8–5.3)
WBC # BLD AUTO: 9 X10(3) UL (ref 4–11)

## 2019-08-03 PROCEDURE — 99233 SBSQ HOSP IP/OBS HIGH 50: CPT | Performed by: OTHER

## 2019-08-03 PROCEDURE — 73502 X-RAY EXAM HIP UNI 2-3 VIEWS: CPT | Performed by: HOSPITALIST

## 2019-08-03 PROCEDURE — 99238 HOSP IP/OBS DSCHRG MGMT 30/<: CPT | Performed by: HOSPITALIST

## 2019-08-03 RX ORDER — GABAPENTIN 100 MG/1
100 CAPSULE ORAL 3 TIMES DAILY
Qty: 90 CAPSULE | Refills: 0 | Status: SHIPPED | OUTPATIENT
Start: 2019-08-03 | End: 2019-10-03

## 2019-08-03 NOTE — PLAN OF CARE
Assumed care at 1900. Alert and oriented x4, forgetful. Telemetry monitor reading SR. Refusing IVF. Neuro checks remain unchanged. CT Brain and carotids and MRI spine complete. Pain relief with Methadone. Fall precautions maintained per protocol. Plan PT? O

## 2019-08-03 NOTE — CM/SW NOTE
Patient requesting Kirstie Connors transport to home.   Nadege Chavira arranged--however ambulance  as enrique for let @ 8:30 pm--or earlier--nurse to be contacted PCS form completed--nurse updated

## 2019-08-03 NOTE — PROGRESS NOTES
[id number] Meka Gallagher Neurology Progress Note    Dione Garrettsaulo Patient Status:  Inpatient    1960 MRN [id number]   [facility] 7NE-A Attending Mike Michael MD   Hosp Day # 2 PCP Chinmay Kennedy       NEUROLOGY   Attending Addendum Floyd Sargent Neutrophil Absolute 6.58 1.50 - 7.70 x10(3) uL    Lymphocyte Absolute 1.54 1.00 - 4.00 x10(3) uL    Monocyte Absolute 0.42 0.10 - 1.00 x10(3) uL    Eosinophil Absolute 0.37 0.00 - 0.70 x10(3) uL    Basophil Absolute 0.05 0.00 - 0.20 x10(3) uL    Immature G right-sided subdural hematoma maximum thickness 6 mm, previously 7 mm. There is slight decrease in the right to left midline shift, presently 5 mm, previously 7 mm. 2. The CTA of the neck and CTA of the head show no significant stenosis.   No dissection, o headache but today only a 6-7/10 compared to 9/10 yesterday.  Still having neck pain along with pain in her hands    She thinks she may need an xray of her left hip and thigh as she has difficulty lifting her left leg     Objective/Physical Exam:    Vital S level over a 0.9 cm craniocaudad dimension length this is also consistent with mild myelomalacia   which is not quite cystic in appearance versus cord edema/gliosis. No mass enhancing lesion. 3. There is mild canal stenosis C4-5 and C6-7.   The patient is MJ eval and recs       Right sided HA slightly improved today, still complaining of neck pain - see MRI cervical spine above - will discuss findings with Dr Coco Spain   CTA brain and carotids with no significant stenosis or occlusion   She is now concerned

## 2019-08-03 NOTE — CM/SW NOTE
Pt refusing acute rehab--wants to go home.   ECIN referrals sent to [facility], [facility], family, [facility], [facility] and [facility] agencies via [facility] responses--response may not be known till post d/c pending insurance acceptance and authorization-

## 2019-08-03 NOTE — DISCHARGE SUMMARY
[facility] [place] HOSPITALIST  DISCHARGE SUMMARY     Mana Walden Patient Status:  Inpatient    1960 MRN [id number]   [facility] 7NE-A Attending Vicente Dejesus MD   Hosp Day # 2 PCP Juan Jose Amezquita     Date of Admission: 2019  Date of [address] [facility] Caps  Commonly known as:  ROCALTROL      Take 0.5 mcg by mouth daily. Refills:  0     fentaNYL 100 MCG/HR Pt72  Commonly known as:  [address] [facility] 1 patch onto the skin every third day.    Refills:  0     furosemide 20 MG Tabs  Commonly known as:  L No edema.   -----------------------------------------------------------------------------------------------  PATIENT DISCHARGE INSTRUCTIONS: See electronic chart    Thomas Montaño MD    Time spent:  > 30 minutes

## 2019-08-03 NOTE — CM/SW NOTE
Call to [facility] ana --message left to follow up with evaluation and recommendation--await all back    Received call back from [address] [place] [address] Street evaluated by dr Calderon Draft of --deemed appropriate for acute rehab--insurance auth initiated @

## 2019-08-03 NOTE — RESPIRATORY THERAPY NOTE
Patient is DENEEN, does were a machine at home, but does not like our options in masks. Patient only wants to wear 3L nasal cannula at night.  to remain on throughout night to monitor O2.  Will continue to monitor

## 2019-08-04 NOTE — CM/SW NOTE
[facility]  accepted pt - spoke with Pleasant Cheadle at [facility] - they will see pt today.

## 2019-08-04 NOTE — CM/SW NOTE
08/04/19 0900   Discharge disposition   Expected discharge disposition Home-Health   Name of Facillity/Home Care/Hospice   ([facility]  )   Patient Refuses Rehab Services Yes   Discharge transportation [facility]

## 2019-08-04 NOTE — PLAN OF CARE
Patient discharge home with med-car for transport. Previous RN gave and educated on all discharge instructions and prescriptions. IV and telemetry monitor removed prior to shift change.

## 2019-08-04 NOTE — PLAN OF CARE
IV, tele removed. DC instructions/follow up appt's reviewed. Script for Gabapentin given. All questions/concerns addressed.

## 2019-08-05 NOTE — PAYOR COMM NOTE
--------------  DISCHARGE REVIEW    Payor: Everett Da #:  [id number]  Authorization Number: [id number]    Admit date: 8/1/19  Admit time:  0308  Discharge Date: 8/3/2019  8:30 AM     Admitting Physician: Sam Jameson DO  Attending Ph

## 2019-08-06 ENCOUNTER — APPOINTMENT (OUTPATIENT)
Dept: CT IMAGING | Facility: HOSPITAL | Age: 59
End: 2019-08-06
Attending: EMERGENCY MEDICINE
Payer: MEDICAID

## 2019-08-06 ENCOUNTER — HOSPITAL ENCOUNTER (OUTPATIENT)
Facility: HOSPITAL | Age: 59
Setting detail: OBSERVATION
Discharge: NURSING, REHAB OR LONG-TERM CARE | End: 2019-08-09
Attending: EMERGENCY MEDICINE | Admitting: HOSPITALIST
Payer: MEDICAID

## 2019-08-06 DIAGNOSIS — R29.6 FREQUENT FALLS: Primary | ICD-10-CM

## 2019-08-06 DIAGNOSIS — F32.A DEPRESSIVE DISORDER: ICD-10-CM

## 2019-08-06 DIAGNOSIS — M08.00 JUVENILE RHEUMATOID ARTHRITIS (HCC): ICD-10-CM

## 2019-08-06 DIAGNOSIS — F41.9 ANXIETY DISORDER, UNSPECIFIED TYPE: ICD-10-CM

## 2019-08-06 DIAGNOSIS — Z86.79 HISTORY OF SUBDURAL HEMATOMA: ICD-10-CM

## 2019-08-06 PROBLEM — S06.5X9A SUBDURAL HEMATOMA (HCC): Status: ACTIVE | Noted: 2019-08-06

## 2019-08-06 LAB
ANION GAP SERPL CALC-SCNC: 4 MMOL/L (ref 0–18)
APTT PPP: 29.8 SECONDS (ref 25.4–36.1)
BASOPHILS # BLD AUTO: 0.06 X10(3) UL (ref 0–0.2)
BASOPHILS NFR BLD AUTO: 0.7 %
BUN BLD-MCNC: 11 MG/DL (ref 7–18)
BUN/CREAT SERPL: 22.9 (ref 10–20)
CALCIUM BLD-MCNC: 10.4 MG/DL (ref 8.5–10.1)
CHLORIDE SERPL-SCNC: 104 MMOL/L (ref 98–112)
CO2 SERPL-SCNC: 29 MMOL/L (ref 21–32)
CREAT BLD-MCNC: 0.48 MG/DL (ref 0.55–1.02)
DEPRECATED RDW RBC AUTO: 47.8 FL (ref 35.1–46.3)
EOSINOPHIL # BLD AUTO: 0.16 X10(3) UL (ref 0–0.7)
EOSINOPHIL NFR BLD AUTO: 2 %
ERYTHROCYTE [DISTWIDTH] IN BLOOD BY AUTOMATED COUNT: 13.5 % (ref 11–15)
GLUCOSE BLD-MCNC: 118 MG/DL (ref 70–99)
HCT VFR BLD AUTO: 35.9 % (ref 35–48)
HGB BLD-MCNC: 11.7 G/DL (ref 12–16)
IMM GRANULOCYTES # BLD AUTO: 0.03 X10(3) UL (ref 0–1)
IMM GRANULOCYTES NFR BLD: 0.4 %
INR BLD: 1.04 (ref 0.9–1.1)
LYMPHOCYTES # BLD AUTO: 2.19 X10(3) UL (ref 1–4)
LYMPHOCYTES NFR BLD AUTO: 27.2 %
MCH RBC QN AUTO: 31.1 PG (ref 26–34)
MCHC RBC AUTO-ENTMCNC: 32.6 G/DL (ref 31–37)
MCV RBC AUTO: 95.5 FL (ref 80–100)
MONOCYTES # BLD AUTO: 0.53 X10(3) UL (ref 0.1–1)
MONOCYTES NFR BLD AUTO: 6.6 %
NEUTROPHILS # BLD AUTO: 5.07 X10 (3) UL (ref 1.5–7.7)
NEUTROPHILS # BLD AUTO: 5.07 X10(3) UL (ref 1.5–7.7)
NEUTROPHILS NFR BLD AUTO: 63.1 %
OSMOLALITY SERPL CALC.SUM OF ELEC: 284 MOSM/KG (ref 275–295)
PLATELET # BLD AUTO: 251 10(3)UL (ref 150–450)
POTASSIUM SERPL-SCNC: 3.5 MMOL/L (ref 3.5–5.1)
PSA SERPL DL<=0.01 NG/ML-MCNC: 14 SECONDS (ref 12.5–14.7)
RBC # BLD AUTO: 3.76 X10(6)UL (ref 3.8–5.3)
SODIUM SERPL-SCNC: 137 MMOL/L (ref 136–145)
WBC # BLD AUTO: 8 X10(3) UL (ref 4–11)

## 2019-08-06 PROCEDURE — 99253 IP/OBS CNSLTJ NEW/EST LOW 45: CPT | Performed by: NEUROLOGICAL SURGERY

## 2019-08-06 PROCEDURE — 99220 INITIAL OBSERVATION CARE,LEVL III: CPT | Performed by: HOSPITALIST

## 2019-08-06 PROCEDURE — 70450 CT HEAD/BRAIN W/O DYE: CPT | Performed by: EMERGENCY MEDICINE

## 2019-08-06 RX ORDER — CALCITRIOL 0.25 UG/1
0.5 CAPSULE, LIQUID FILLED ORAL DAILY
Status: DISCONTINUED | OUTPATIENT
Start: 2019-08-06 | End: 2019-08-09

## 2019-08-06 RX ORDER — METOCLOPRAMIDE HYDROCHLORIDE 5 MG/ML
10 INJECTION INTRAMUSCULAR; INTRAVENOUS EVERY 8 HOURS PRN
Status: DISCONTINUED | OUTPATIENT
Start: 2019-08-06 | End: 2019-08-09

## 2019-08-06 RX ORDER — ONDANSETRON 2 MG/ML
4 INJECTION INTRAMUSCULAR; INTRAVENOUS EVERY 6 HOURS PRN
Status: DISCONTINUED | OUTPATIENT
Start: 2019-08-06 | End: 2019-08-09

## 2019-08-06 RX ORDER — POTASSIUM CHLORIDE 20 MEQ/1
40 TABLET, EXTENDED RELEASE ORAL EVERY 4 HOURS
Status: DISCONTINUED | OUTPATIENT
Start: 2019-08-06 | End: 2019-08-06 | Stop reason: SDUPTHER

## 2019-08-06 RX ORDER — PANTOPRAZOLE SODIUM 20 MG/1
20 TABLET, DELAYED RELEASE ORAL
Status: DISCONTINUED | OUTPATIENT
Start: 2019-08-06 | End: 2019-08-09

## 2019-08-06 RX ORDER — ONDANSETRON 2 MG/ML
4 INJECTION INTRAMUSCULAR; INTRAVENOUS EVERY 4 HOURS PRN
Status: DISCONTINUED | OUTPATIENT
Start: 2019-08-06 | End: 2019-08-08

## 2019-08-06 RX ORDER — FUROSEMIDE 20 MG/1
20 TABLET ORAL DAILY PRN
Status: DISCONTINUED | OUTPATIENT
Start: 2019-08-06 | End: 2019-08-09

## 2019-08-06 RX ORDER — METHADONE HYDROCHLORIDE 5 MG/1
60 TABLET ORAL NIGHTLY
COMMUNITY
End: 2019-10-03

## 2019-08-06 RX ORDER — METHADONE HYDROCHLORIDE 5 MG/1
5 TABLET ORAL 2 TIMES DAILY WITH MEALS
Status: DISCONTINUED | OUTPATIENT
Start: 2019-08-06 | End: 2019-08-06

## 2019-08-06 RX ORDER — SODIUM CHLORIDE 9 MG/ML
1000 INJECTION, SOLUTION INTRAVENOUS ONCE
Status: COMPLETED | OUTPATIENT
Start: 2019-08-06 | End: 2019-08-06

## 2019-08-06 RX ORDER — AMITRIPTYLINE HYDROCHLORIDE 50 MG/1
150 TABLET, FILM COATED ORAL NIGHTLY
Status: DISCONTINUED | OUTPATIENT
Start: 2019-08-06 | End: 2019-08-09

## 2019-08-06 RX ORDER — HEPARIN SODIUM 5000 [USP'U]/ML
5000 INJECTION, SOLUTION INTRAVENOUS; SUBCUTANEOUS EVERY 8 HOURS SCHEDULED
Status: DISCONTINUED | OUTPATIENT
Start: 2019-08-06 | End: 2019-08-06

## 2019-08-06 RX ORDER — DOCUSATE SODIUM 100 MG/1
100 CAPSULE, LIQUID FILLED ORAL 2 TIMES DAILY
Status: DISCONTINUED | OUTPATIENT
Start: 2019-08-06 | End: 2019-08-09

## 2019-08-06 RX ORDER — TIZANIDINE 4 MG/1
8 TABLET ORAL 3 TIMES DAILY
Status: DISCONTINUED | OUTPATIENT
Start: 2019-08-06 | End: 2019-08-09

## 2019-08-06 RX ORDER — METHADONE HYDROCHLORIDE 10 MG/1
50 TABLET ORAL 2 TIMES DAILY WITH MEALS
Status: DISCONTINUED | OUTPATIENT
Start: 2019-08-06 | End: 2019-08-09

## 2019-08-06 RX ORDER — ACETAMINOPHEN 325 MG/1
650 TABLET ORAL EVERY 6 HOURS PRN
Status: DISCONTINUED | OUTPATIENT
Start: 2019-08-06 | End: 2019-08-09

## 2019-08-06 RX ORDER — FENTANYL 100 UG/H
1 PATCH TRANSDERMAL
Status: DISCONTINUED | OUTPATIENT
Start: 2019-08-06 | End: 2019-08-09

## 2019-08-06 RX ORDER — BISACODYL 10 MG
10 SUPPOSITORY, RECTAL RECTAL
Status: DISCONTINUED | OUTPATIENT
Start: 2019-08-06 | End: 2019-08-09

## 2019-08-06 RX ORDER — SODIUM CHLORIDE 9 MG/ML
INJECTION, SOLUTION INTRAVENOUS CONTINUOUS
Status: ACTIVE | OUTPATIENT
Start: 2019-08-06 | End: 2019-08-06

## 2019-08-06 RX ORDER — POLYETHYLENE GLYCOL 3350 17 G/17G
17 POWDER, FOR SOLUTION ORAL DAILY PRN
Status: DISCONTINUED | OUTPATIENT
Start: 2019-08-06 | End: 2019-08-09

## 2019-08-06 RX ORDER — SODIUM PHOSPHATE, DIBASIC AND SODIUM PHOSPHATE, MONOBASIC 7; 19 G/133ML; G/133ML
1 ENEMA RECTAL ONCE AS NEEDED
Status: DISCONTINUED | OUTPATIENT
Start: 2019-08-06 | End: 2019-08-09

## 2019-08-06 RX ORDER — METHADONE HYDROCHLORIDE 5 MG/5ML
6 SOLUTION ORAL NIGHTLY
Status: DISCONTINUED | OUTPATIENT
Start: 2019-08-06 | End: 2019-08-06

## 2019-08-06 RX ORDER — METHADONE HYDROCHLORIDE 10 MG/1
60 TABLET ORAL NIGHTLY
Status: DISCONTINUED | OUTPATIENT
Start: 2019-08-06 | End: 2019-08-09

## 2019-08-06 RX ORDER — GABAPENTIN 100 MG/1
100 CAPSULE ORAL 3 TIMES DAILY
Status: DISCONTINUED | OUTPATIENT
Start: 2019-08-06 | End: 2019-08-08

## 2019-08-06 RX ORDER — POTASSIUM CHLORIDE 750 MG/1
40 TABLET, EXTENDED RELEASE ORAL EVERY 4 HOURS
Status: DISPENSED | OUTPATIENT
Start: 2019-08-06 | End: 2019-08-06

## 2019-08-06 NOTE — PLAN OF CARE
Pt admitted from ER with frequent falls. Alert and oriented x4. Vitals stable, afebrile. C/o headache pain, will obtain order for pain meds. Moves all extremities at baseline. Calluses noted to both outer feet. Pictures taken and placed on chart.  Dr. Alejandra Cruz

## 2019-08-06 NOTE — ED INITIAL ASSESSMENT (HPI)
A/O X 4. Patient presents with multiple falls. Patient states that she was seen here the other day for a fall.   States today that her knees gave out and she fell and hit her forehead, denies any LOC, not on any blood thinners

## 2019-08-06 NOTE — PROGRESS NOTES
08/06/19 1608   Clinical Encounter Type   Visited With Patient not available  (Pt sleeping soundly at the time of two visits)   Routine Visit Introduction   Continue Visiting No

## 2019-08-06 NOTE — PROGRESS NOTES
08/06/19 1352   Clinical Encounter Type   Visited With Patient not available    attempted to visit couple of times; however, patient was sound sleeping and family was not present. Prayed silently for the patient/family.  Respected patient's time

## 2019-08-06 NOTE — H&P
[facility]  History and Physical     Yaron Lutz Patient Status:  Observation    1960 MRN [id number]   [facility] 3SW-A Attending Marciana Buerger [address] Day # 0 PCP Trace Martínez     Chief Complaint: Frequent fa (IRON 27 OR) Take by mouth daily. Disp:  Rfl:    TiZANidine HCl 4 MG Oral Tab Take 8 mg by mouth 3 (three) times daily. Disp:  Rfl:    Amitriptyline HCl 150 MG Oral Tab Take 150 mg by mouth nightly.    Disp:  Rfl:        Review of Systems:   A comprehen 105 108  --   --  104   CO2 33.0* 29.0  --   --  29.0   ALKPHO 101  --   --   --   --    AST 19  --   --   --   --    ALT 14  --   --   --   --    BILT 0.5  --   --   --   --    TP 7.1  --   --   --   --        Estimated Creatinine Clearance: 99.4 mL/min (

## 2019-08-06 NOTE — CONSULTS
[facility]  Neurosurgery Consult    Pedro Dawn Patient Status:  Observation    1960 MRN [id number]   [facility] 3SW-A Attending Asim Rider, DO   Hosp Day # 0 PCP MARIPOSA BENNETT     REASON FOR CONSULTATION:  Right SDH    HIS Ferrous Gluconate (IRON 27 OR) Take by mouth daily. Disp:  Rfl:  Taking   TiZANidine HCl 4 MG Oral Tab Take 8 mg by mouth 3 (three) times daily. Disp:  Rfl:  Taking   Amitriptyline HCl 150 MG Oral Tab Take 150 mg by mouth nightly.    Disp:  Rfl:  Denzel Dunbar patient is alert and orientated x 3. Speech fluent. Comprehension intact. PERRLA +3 brisk,  EOMI. Face is symmetrical. CN's GI. Sensation to light touch is intact bilaterally. Strengths 5/5 bilaterally.   Gait deferred      DIAGNOSTIC DATA: Lab Result presently 4 mm. Followup recommended  2. No acute territorial infarction.     ASSESSMENT:  61year old female s/p multiple falls with grossly unchanged right SDH    Plan:  Discussed with Dr. Renu Dennis  No acute surgical intervention  Ok to discharge to [facility]

## 2019-08-06 NOTE — ED PROVIDER NOTES
Patient Seen in: [facility] Emergency Department    History   Patient presents with:  Fall (musculoskeletal, neurologic)    Stated Complaint: Fall    HPI    49-year-old female with a history of juvenile rheumatoid arthritis, history of frequent fall oriented. No acute distress. HEENT: Normocephalic. No evidence of trauma. Extraocular movements are intact. Cardiovascular exam: Regular rate and rhythm  Lungs: Clear to auscultation bilaterally. Abdomen: Soft, nondistended, nontender.   Extremities: No Consultation with Bozena Mello hospitalist Dr. Anamaria Granado. Patient states that she is willing to go to [facility] at this time. Findings  of the patient's tests results were discussed with the patient in detail.   They were understanding of these results and t

## 2019-08-06 NOTE — CM/SW NOTE
Patient was seen by Mikki perez on her last admission as was clinically appropriate for acute rehab, insurance pending. Patient decided that she could go home on 8*3*19 and was accepted by [facility].   Patient re admitted to ED after falls, receptive t

## 2019-08-06 NOTE — CM/SW NOTE
Pt was just d/c'd to home with home care after she declined acute rehab. She was evaluated and accepted by Jyoti perez on 8/3/19, but then refused to go. She presents to the ED again today after a fall at home. She now states she wants rehab.   I did call

## 2019-08-06 NOTE — PLAN OF CARE
Received call from radiologist. He believes the CT head was previously read wrong. He states the SDH is actually 1 mm bigger than before. Dr. Maciej Jordan paged with update. Will continue to monitor.

## 2019-08-07 LAB — POTASSIUM SERPL-SCNC: 4.1 MMOL/L (ref 3.5–5.1)

## 2019-08-07 PROCEDURE — 99225 SUBSEQUENT OBSERVATION CARE: CPT | Performed by: HOSPITALIST

## 2019-08-07 RX ORDER — NICOTINE 21 MG/24HR
1 PATCH, TRANSDERMAL 24 HOURS TRANSDERMAL DAILY
Status: DISCONTINUED | OUTPATIENT
Start: 2019-08-07 | End: 2019-08-09

## 2019-08-07 RX ORDER — MECLIZINE HYDROCHLORIDE CHEWABLE TABLETS 25 MG/1
1 TABLET, CHEWABLE ORAL 3 TIMES DAILY PRN
Qty: 30 TABLET | Refills: 0 | Status: SHIPPED | OUTPATIENT
Start: 2019-08-07 | End: 2019-09-06

## 2019-08-07 RX ORDER — NICOTINE 21 MG/24HR
1 PATCH, TRANSDERMAL 24 HOURS TRANSDERMAL DAILY
Qty: 7 PATCH | Refills: 0 | Status: SHIPPED | OUTPATIENT
Start: 2019-08-07

## 2019-08-07 NOTE — CM/SW NOTE
Met with pt to discuss DC planning. Pt has history of dwarfism and juvenile RA. Pt lives alone and has 2 children who are supportive. Pt's son is her approved caregiver through [employer] and assists pt in the evenings after work.   Pt's dtr assists at times w Housework;Driving;Meals; Bathing;Dressing   Services in place prior to admission DME/Supplies at home;[facility]   Type of DME/Supplies Wheelchair;Electric Wheelchair; Shower Chair  (crutches)   [facility] Provider Department of [facility] [place]

## 2019-08-07 NOTE — PROGRESS NOTES
[facility]  Progress Note     Cami Noble Patient Status:  Observation    1960 MRN [id number]   [facility] 3SW-A Attending Meena Armstrong MD   Hosp Day # 0 PCP Soni Alexander     Chief Complaint: falls    S: Patient stable. in this interval not displayed. Estimated Creatinine Clearance: 99.4 mL/min (A) (based on SCr of 0.48 mg/dL (L)).     Recent Labs   Lab 08/01/19  0259 08/06/19  0107   PTP 14.0 14.0   INR 1.04 1.04       Recent Labs   Lab 08/01/19  0036   TROP <0.045

## 2019-08-07 NOTE — CONSULTS
[facility]    Susanna Hermosillo Patient Status:  Observation    1960 MRN [id number]   [facility] 3SW-A Attending Conrad Marie MD   Hosp Day # 0 PCP Karen Godinez     Patient Identification  Susanna Hermosillo is a 61year old female. measures 4 mm, previously 3 mm. There is slight decrease in the right left midline shift, previously 5 mm, presently 4 mm. Followup recommended  2. No acute territorial infarction  No seizures. Has R sided visual impairments with blurriness.   No lateral Intravenous Q8H PRN   docusate sodium (COLACE) cap 100 mg 100 mg Oral BID   PEG 3350 (MIRALAX) powder packet 17 g 17 g Oral Daily PRN   magnesium hydroxide (MILK OF MAGNESIA) 400 MG/5ML suspension 30 mL 30 mL Oral Daily PRN   bisacodyl (DULCOLAX) rectal cavazos in all extremities. Abdomen:   No tenderness guarding or rigidity. Liver and spleen are not enlarged. Bowel sounds present. Musculoskeletal:     Right Upper Extremity:  Strength is 3. ROM Limited.    Left Upper Extremity:  Strength is length of stay in recommended rehabilitation level of care is 2-3 weeks. The patient has fair potential to achieve the goal to return home at S/Mod I level of function. Advance directives reviewed and noted.           Gilma

## 2019-08-07 NOTE — PHYSICAL THERAPY NOTE
PHYSICAL THERAPY EVALUATION - INPATIENT     Room Number: 360/360-A  Evaluation Date: 8/7/2019  Type of Evaluation: Initial  Physician Order: PT Eval and Treat    Presenting Problem: S/p Fall on 08/06/19  Reason for Therapy: Mobility Dysfunction and D Per patient her son comes daily to assist her with ADLs & self care. Mostly patient dresses in night gown. If needed to go out of apartment,son helps her to get dressed, H/o multiple falls.     SUBJECTIVE  \"They need to xray my knees,They gives out & I fal Dorsiflexion  2/5    BALANCE  Static Sitting: Fair +  Dynamic Sitting: Fair  Static Standing: Poor +  Dynamic Standing: Poor +    ADDITIONAL TESTS  Additional Tests: None      AM-PAC '6-Clicks' INPATIENT SHORT FORM - BASIC MOBILITY  How much difficulty rosado smaller size chair & low bed for ease of patient during transfers.     Exercise/Education Provided:  Bed mobility  Body mechanics  Functional activity tolerated  Gait training  Neuromuscular re-educate  Transfer training    Patient End of Session: Up in ailyn re-educate;Strengthening;Transfer training;Balance training  Rehab Potential : Good  Frequency (Obs): Daily  Number of Visits to Meet Established Goals: 5      CURRENT GOALS    Goal #1 Patient is able to demonstrate supine - sit EOB @ level: minimum assist

## 2019-08-07 NOTE — PLAN OF CARE
D/c planning for today, states smokes a pack/day of cigarettes, Nicoderm patch ordered, Orthostatic bp's done, pain meds given as ordered prn., PMR consult ordered.

## 2019-08-07 NOTE — PLAN OF CARE
Pt rates pain high from headache then falls asleep after taking methadone. At dinner time she stated that methadone does not help with headache, requested tylenol. Tylenol given. PT and OT to see pt, pt has poor bed mobility.  Has special crutches and famil

## 2019-08-07 NOTE — CM/SW NOTE
Spoke with Dr Doe Khoury who met with pt today. She will recommend acute rehab and stated pt is agreeable. Spoke with Wilhelm Bumpers from [place] [address] who will request insurance auth. / to remain available for support and/or discharge planning.      Kr

## 2019-08-07 NOTE — PLAN OF CARE
Pain well controlled with scheduled PO pain meds. Was up at [facility] to attempt to have a BM. Requested Miralax. Had a large formed BM about an hour later. Transferring with max assist, due to not having her crutches here. VSS. On RA. Bed alarm on for safety.  Bennie Riley

## 2019-08-07 NOTE — OCCUPATIONAL THERAPY NOTE
OCCUPATIONAL THERAPY EVALUATION - INPATIENT     Room Number: 360/360-A  Evaluation Date: 8/7/2019  Type of Evaluation: Initial  Presenting Problem: Multiple falls, recent SDH    Physician Order: IP Consult to Occupational Therapy  Reason for Therapy: ADL/I underwear difficult to manage), toilet transfers, bed mobility) and functional mobility via crutches within apartment, wheelchair in community prior to admission.  She reports she typically only wears nightgowns around the house and is able to don/doff hers RA  Left Elbow extension unable to achieve full extension, reports baseline from RA  Right Wrist extension unable to achieve active extension past neutral, reports baseline from RA  Left Wrist extension unable to achieve active extension past neutral, repo side via max assist with increased time and HOB elevated; sitting EOB several minutes with initial min assist for balance improving to close SBA; total assist required for donning B socks and underwear to waist, patient reports this is her baseline; standi supports that patients with this level of impairment often benefit from rehab at discharge. The patient is below her baseline and would benefit from continued skilled OT to address the activity limitations identified by the AM-PAC \"6 clicks\".  Acute rehab supervision

## 2019-08-08 PROCEDURE — 99224 SUBSEQUENT OBSERVATION CARE: CPT | Performed by: HOSPITALIST

## 2019-08-08 RX ORDER — GABAPENTIN 100 MG/1
200 CAPSULE ORAL 3 TIMES DAILY
Status: DISCONTINUED | OUTPATIENT
Start: 2019-08-08 | End: 2019-08-09

## 2019-08-08 NOTE — PROGRESS NOTES
[facility]  Progress Note     Ashok Sparks Patient Status:  Observation    1960 MRN [id number]   [facility] 3SW-A Attending Nona Hernandez MD   Hosp Day # 0 PCP Jess Lynn     Chief Complaint: falls    S: Patient stable. • tiZANidine HCl  8 mg Oral TID   • docusate sodium  100 mg Oral BID   • Methadone HCl  60 mg Oral Nightly   • Methadone HCl  50 mg Oral BID with meals       ASSESSMENT / PLAN:     1.  History of juvenile rheumatoid arthritis on high doses of narcotics fo

## 2019-08-08 NOTE — CM/SW NOTE
Spoke with Catherine Dan from [facility]. Insurance Tyra Jimenes is still pending. Anticipate discharge to rehab once auth received. / to remain available for support and/or discharge planning.      Kyler Carreno LCSW  Discharge Planner  6

## 2019-08-08 NOTE — OCCUPATIONAL THERAPY NOTE
OCCUPATIONAL THERAPY TREATMENT NOTE - INPATIENT     Room Number: 360/360-A  Session: 1   Number of Visits to Meet Established Goals: 5    Presenting Problem: Multiple falls, recent SDH    History related to current admission: Patient is 61year old female techniques;Relaxation;Repositioning     ACTIVITY TOLERANCE                         O2 SATURATIONS                ACTIVITIES OF DAILY LIVING ASSESSMENT  AM-PAC ‘6-Clicks’ Inpatient Daily Activity Short Form  How much help from another person does the patien on safety, fall prevention, OT role, care plan, activity promotion. Will continue to follow. Patient End of Session: Up in chair;Needs met;Call light within reach; All patient questions and concerns addressed    ASSESSMENT   Patient seen for OT services t

## 2019-08-08 NOTE — PROGRESS NOTES
AM meds given, Tylenol given for headache. Nicotine patch removed from RUE, new nicotine patch applied to LUE. Fentanyl patch noted to upper chest area. Pt up with crutches and min assist, voiding via BSC. Awaiting approval for  acute rehab.  Will monitor

## 2019-08-08 NOTE — PHYSICAL THERAPY NOTE
PHYSICAL THERAPY TREATMENT NOTE - INPATIENT    Room Number: 360/360-A     Session: 1  Number of Visits to Meet Established Goals: 5    Presenting Problem: S/p Fall on 08/06/19  History related to current admission: Patient is 61years old female who was a promotion; Body mechanics;Breathing techniques;Relaxation;Repositioning    BALANCE                                                                                                                     Static Sitting: Fair +  Dynamic Sitting: Laurent Ingram min assist to scoot in chair due to height of chair. Patient was left up in bedside recliner @ end of session. Patient End of Session: Up in chair;Needs met;Call light within reach;RN aware of session/findings; All patient questions and concerns Valery Can ambulate 25 feet with assist device: crutches (axillary) at assistance level: minimum assistance --- Met 08/08/19 - Upgraded to supervision   Goal #4     Goal #5     Goal #6     Goal Comments: Goals established on 8/7/2019,Ongoing 098/08/19

## 2019-08-08 NOTE — PLAN OF CARE
Pt still complaints of headache d/t fall. Relieved with Tylenol PO PRN. No complaints of nausea/dizziness/lightheadedness. Fentanyl patch on the left side of the chest and nicotine patch on the RUE. Plan is [facility] when accepted.  Up with crutches x 2 mod assist

## 2019-08-08 NOTE — CM/SW NOTE
Spoke with Bill Daniel from [place] [address] who stated insurance [place] is still pending. / to remain available for support and/or discharge planning.      Armida Romero [facility]  Discharge Planner  [phone], pager [phone]

## 2019-08-09 VITALS
HEART RATE: 100 BPM | RESPIRATION RATE: 18 BRPM | SYSTOLIC BLOOD PRESSURE: 96 MMHG | TEMPERATURE: 99 F | OXYGEN SATURATION: 96 % | WEIGHT: 90 LBS | DIASTOLIC BLOOD PRESSURE: 75 MMHG | HEIGHT: 55 IN | BODY MASS INDEX: 20.83 KG/M2

## 2019-08-09 PROCEDURE — 99217 OBSERVATION CARE DISCHARGE: CPT | Performed by: HOSPITALIST

## 2019-08-09 RX ORDER — FENTANYL 100 UG/H
1 PATCH TRANSDERMAL
Qty: 7 PATCH | Refills: 0 | Status: SHIPPED | OUTPATIENT
Start: 2019-08-09

## 2019-08-09 RX ORDER — NALOXONE HYDROCHLORIDE 4 MG/.1ML
4 SPRAY, METERED NASAL AS NEEDED
Qty: 1 KIT | Refills: 0 | Status: SHIPPED | OUTPATIENT
Start: 2019-08-09

## 2019-08-09 RX ORDER — METHADONE HYDROCHLORIDE 10 MG/1
50 TABLET ORAL 2 TIMES DAILY WITH MEALS
Qty: 60 TABLET | Refills: 0 | Status: SHIPPED | OUTPATIENT
Start: 2019-08-09 | End: 2019-10-03

## 2019-08-09 NOTE — PROGRESS NOTES
Pt seen and examined. Accepted to SNF.   OK to DC today  D/w cervical myelopathy    DC summary to follow  Roshan Maldonado MD

## 2019-08-09 NOTE — PLAN OF CARE
Plan for transfer to [facility] this afternoon. Pt going via [employer]. Called report to [facility]  Medicated for head ache prior to d/c to [facility],removed iv site prior to d/c  gave report to [employer]. Pt transported via wheelchair.  Copy of chart and rx

## 2019-08-09 NOTE — PLAN OF CARE
Started having increased pain due to RA, gave scheduled pain meds and had relief afterwards and was able to sleep comfortably. Remains on RA. VSS. Up with mod assist and crutches. Will continue to monitor.

## 2019-08-09 NOTE — CM/SW NOTE
08/09/19 1500   Discharge disposition   Expected discharge disposition [facility] [address]   Name of Facillity/Home Care/Hospice [facility]   Discharge transportation [facility]

## 2019-08-09 NOTE — PHYSICAL THERAPY NOTE
IP PT attempted, pt refusing stating 9/10 HA pain. RN informed. Will re-attempt as appropriate and as schedule permits. Attempt x 2 , pt occupied with RN . Will re-attempt as schedule permits.

## 2019-08-09 NOTE — CM/SW NOTE
Spoke with Carolina Martinez from [facility] who stated insurance Daisy Salgado is still pending. Anticipate DC to  acute rehab once auth received. / to remain available for support and/or discharge planning.      Sury Harmon, DAMIEN  Discharge

## 2019-08-12 NOTE — DISCHARGE SUMMARY
[facility] HOSPITAL[facility]  DISCHARGE SUMMARY     Raisa Fry Patient Status:  Observation    1960 MRN [id number]   [facility] [place] 3SW-A Attending No att. providers found   Hosp Day # 0 PCP Darren Lara     Date of Admission: 2019  Date o recommendations (brief descriptions):  • none    Lab/Test results pending at Discharge:   · none    Consultants:  • NS    Discharge Medication List:     Discharge Medications      START taking these medications      Instructions Prescription details   Mecl known as:  PRILOSEC      Take 20 mg by mouth every morning before breakfast.   Refills:  0     Senna 8.6 MG Tabs  Commonly known as:  SENOKOT      Take 8.6 mg by mouth 2 (two) times daily.    Refills:  0     tiZANidine HCl 4 MG Tabs  Commonly known as:  Marshal Ross

## 2019-09-15 ENCOUNTER — APPOINTMENT (OUTPATIENT)
Dept: GENERAL RADIOLOGY | Facility: HOSPITAL | Age: 59
End: 2019-09-15
Attending: EMERGENCY MEDICINE
Payer: MEDICAID

## 2019-09-15 ENCOUNTER — APPOINTMENT (OUTPATIENT)
Dept: CT IMAGING | Facility: HOSPITAL | Age: 59
End: 2019-09-15
Attending: EMERGENCY MEDICINE
Payer: MEDICAID

## 2019-09-15 ENCOUNTER — HOSPITAL ENCOUNTER (EMERGENCY)
Facility: HOSPITAL | Age: 59
Discharge: HOME | End: 2019-09-15
Attending: EMERGENCY MEDICINE
Payer: MEDICAID

## 2019-09-15 VITALS
OXYGEN SATURATION: 98 % | HEART RATE: 93 BPM | SYSTOLIC BLOOD PRESSURE: 114 MMHG | RESPIRATION RATE: 18 BRPM | DIASTOLIC BLOOD PRESSURE: 82 MMHG

## 2019-09-15 DIAGNOSIS — S16.1XXA STRAIN OF NECK MUSCLE, INITIAL ENCOUNTER: ICD-10-CM

## 2019-09-15 DIAGNOSIS — S40.011A CONTUSION OF RIGHT SHOULDER, INITIAL ENCOUNTER: Primary | ICD-10-CM
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
## 2019-09-15 DIAGNOSIS — S00.03XA CONTUSION OF SCALP, INITIAL ENCOUNTER: ICD-10-CM

## 2019-09-15 LAB
ALBUMIN SERPL-MCNC: 3.8 G/DL (ref 3.4–5)
ALBUMIN/GLOB SERPL: 1.1 {RATIO} (ref 1–2)
ALP LIVER SERPL-CCNC: 110 U/L (ref 46–118)
ALT SERPL-CCNC: 18 U/L (ref 13–56)
ANION GAP SERPL CALC-SCNC: 3 MMOL/L (ref 0–18)
AST SERPL-CCNC: 25 U/L (ref 15–37)
BASOPHILS # BLD AUTO: 0.02 X10(3) UL (ref 0–0.2)
BASOPHILS NFR BLD AUTO: 0.3 %
BILIRUB SERPL-MCNC: 0.3 MG/DL (ref 0.1–2)
BILIRUB UR QL STRIP.AUTO: NEGATIVE
BUN BLD-MCNC: 11 MG/DL (ref 7–18)
BUN/CREAT SERPL: 28.9 (ref 10–20)
CALCIUM BLD-MCNC: 10 MG/DL (ref 8.5–10.1)
CHLORIDE SERPL-SCNC: 107 MMOL/L (ref 98–112)
CK SERPL-CCNC: 91 U/L (ref 26–192)
CLARITY UR REFRACT.AUTO: CLEAR
CO2 SERPL-SCNC: 29 MMOL/L (ref 21–32)
COLOR UR AUTO: YELLOW
CREAT BLD-MCNC: 0.38 MG/DL (ref 0.55–1.02)
DEPRECATED RDW RBC AUTO: 45.6 FL (ref 35.1–46.3)
EOSINOPHIL # BLD AUTO: 0.02 X10(3) UL (ref 0–0.7)
EOSINOPHIL NFR BLD AUTO: 0.3 %
ERYTHROCYTE [DISTWIDTH] IN BLOOD BY AUTOMATED COUNT: 13.2 % (ref 11–15)
GLOBULIN PLAS-MCNC: 3.5 G/DL (ref 2.8–4.4)
GLUCOSE BLD-MCNC: 119 MG/DL (ref 70–99)
GLUCOSE UR STRIP.AUTO-MCNC: NEGATIVE MG/DL
HCT VFR BLD AUTO: 40 % (ref 35–48)
HGB BLD-MCNC: 13.3 G/DL (ref 12–16)
HYALINE CASTS #/AREA URNS AUTO: PRESENT /LPF
IMM GRANULOCYTES # BLD AUTO: 0.02 X10(3) UL (ref 0–1)
IMM GRANULOCYTES NFR BLD: 0.3 %
KETONES UR STRIP.AUTO-MCNC: NEGATIVE MG/DL
LYMPHOCYTES # BLD AUTO: 0.79 X10(3) UL (ref 1–4)
LYMPHOCYTES NFR BLD AUTO: 11.4 %
M PROTEIN MFR SERPL ELPH: 7.3 G/DL (ref 6.4–8.2)
MCH RBC QN AUTO: 31.2 PG (ref 26–34)
MCHC RBC AUTO-ENTMCNC: 33.3 G/DL (ref 31–37)
MCV RBC AUTO: 93.9 FL (ref 80–100)
MONOCYTES # BLD AUTO: 0.34 X10(3) UL (ref 0.1–1)
MONOCYTES NFR BLD AUTO: 4.9 %
NEUTROPHILS # BLD AUTO: 5.75 X10 (3) UL (ref 1.5–7.7)
NEUTROPHILS # BLD AUTO: 5.75 X10(3) UL (ref 1.5–7.7)
NEUTROPHILS NFR BLD AUTO: 82.8 %
NITRITE UR QL STRIP.AUTO: NEGATIVE
OSMOLALITY SERPL CALC.SUM OF ELEC: 289 MOSM/KG (ref 275–295)
PH UR STRIP.AUTO: 5 [PH] (ref 4.5–8)
PLATELET # BLD AUTO: 204 10(3)UL (ref 150–450)
POTASSIUM SERPL-SCNC: 3.8 MMOL/L (ref 3.5–5.1)
PROT UR STRIP.AUTO-MCNC: NEGATIVE MG/DL
RBC # BLD AUTO: 4.26 X10(6)UL (ref 3.8–5.3)
SODIUM SERPL-SCNC: 139 MMOL/L (ref 136–145)
SP GR UR STRIP.AUTO: 1.01 (ref 1–1.03)
TROPONIN I SERPL-MCNC: <0.045 NG/ML (ref ?–0.04)
UROBILINOGEN UR STRIP.AUTO-MCNC: <2 MG/DL
WBC # BLD AUTO: 6.9 X10(3) UL (ref 4–11)

## 2019-09-15 PROCEDURE — 73030 X-RAY EXAM OF SHOULDER: CPT | Performed by: EMERGENCY MEDICINE

## 2019-09-15 PROCEDURE — 80053 COMPREHEN METABOLIC PANEL: CPT | Performed by: EMERGENCY MEDICINE

## 2019-09-15 PROCEDURE — 81001 URINALYSIS AUTO W/SCOPE: CPT | Performed by: EMERGENCY MEDICINE

## 2019-09-15 PROCEDURE — 99285 EMERGENCY DEPT VISIT HI MDM: CPT

## 2019-09-15 PROCEDURE — 93005 ELECTROCARDIOGRAM TRACING: CPT

## 2019-09-15 PROCEDURE — 70450 CT HEAD/BRAIN W/O DYE: CPT | Performed by: EMERGENCY MEDICINE

## 2019-09-15 PROCEDURE — 84484 ASSAY OF TROPONIN QUANT: CPT | Performed by: EMERGENCY MEDICINE

## 2019-09-15 PROCEDURE — 72125 CT NECK SPINE W/O DYE: CPT | Performed by: EMERGENCY MEDICINE

## 2019-09-15 PROCEDURE — 85025 COMPLETE CBC W/AUTO DIFF WBC: CPT | Performed by: EMERGENCY MEDICINE

## 2019-09-15 PROCEDURE — 71046 X-RAY EXAM CHEST 2 VIEWS: CPT | Performed by: EMERGENCY MEDICINE

## 2019-09-15 PROCEDURE — 93010 ELECTROCARDIOGRAM REPORT: CPT

## 2019-09-15 PROCEDURE — 82550 ASSAY OF CK (CPK): CPT | Performed by: EMERGENCY MEDICINE

## 2019-09-15 PROCEDURE — 96361 HYDRATE IV INFUSION ADD-ON: CPT

## 2019-09-15 PROCEDURE — 96374 THER/PROPH/DIAG INJ IV PUSH: CPT

## 2019-09-15 RX ORDER — MORPHINE SULFATE 4 MG/ML
4 INJECTION, SOLUTION INTRAMUSCULAR; INTRAVENOUS EVERY 30 MIN PRN
Status: DISCONTINUED | OUTPATIENT
Start: 2019-09-15 | End: 2019-09-15

## 2019-09-15 RX ORDER — SODIUM CHLORIDE 9 MG/ML
125 INJECTION, SOLUTION INTRAVENOUS CONTINUOUS
Status: DISCONTINUED | OUTPATIENT
Start: 2019-09-15 | End: 2019-09-15

## 2019-09-15 NOTE — ED INITIAL ASSESSMENT (HPI)
Patient with fall yesterday, she c/o right shoulder pain and headache. C/o nausea on arrival. Patient lives by self and falls frequently per medics.

## 2019-09-15 NOTE — ED PROVIDER NOTES
Patient Seen in: [facility] Emergency Department    History   Patient presents with:  Fall (musculoskeletal, neurologic)    Stated Complaint: Fall    HPI  Patient is a 40-year-old female who has frequent falls secondary to rheumatoid arthritis, mult accommodation. Mouth normal, neck supple, no meningismus. Mild right-sided neck tenderness. No crepitus or step-off. Mild tenderness anterior lateral aspect of her right shoulder. No  LUNGS: Lungs clear to auscultation bilaterally.   CARDIOVASCULAR: + -----------         ------                     CBC W/ DIFFERENTIAL[[id number]]          Abnormal            Final result                 Please view results for these tests on the individual orders.      EKG    Rate, intervals and axes as noted on EKG Report

## 2019-09-15 NOTE — CM/SW NOTE
Asked to see patient by ER MD. Patient is a 60 yo female with a history significant for JRA. She lives alone at [facility]. Patient was most recently hospitalized at [facility] 8/1-8/3 after a fall. She went to [facility] acute rehab.  Patient re

## 2019-09-16 LAB
ATRIAL RATE: 76 BPM
P AXIS: 78 DEGREES
P-R INTERVAL: 190 MS
Q-T INTERVAL: 396 MS
QRS DURATION: 100 MS
QTC CALCULATION (BEZET): 445 MS
R AXIS: 41 DEGREES
T AXIS: 71 DEGREES
VENTRICULAR RATE: 76 BPM

## 2019-10-03 ENCOUNTER — APPOINTMENT (OUTPATIENT)
Dept: GENERAL RADIOLOGY | Facility: HOSPITAL | Age: 59
DRG: 547 | End: 2019-10-03
Attending: EMERGENCY MEDICINE
Payer: MEDICAID

## 2019-10-03 ENCOUNTER — HOSPITAL ENCOUNTER (INPATIENT)
Facility: HOSPITAL | Age: 59
LOS: 3 days | Discharge: HOME | DRG: 547 | End: 2019-10-06
Attending: EMERGENCY MEDICINE | Admitting: HOSPITALIST
Payer: MEDICAID

## 2019-10-03 DIAGNOSIS — R29.6 FREQUENT FALLS: Primary | ICD-10-CM

## 2019-10-03 DIAGNOSIS — S70.01XA CONTUSION OF RIGHT HIP, INITIAL ENCOUNTER: ICD-10-CM

## 2019-10-03 DIAGNOSIS — R53.1 WEAKNESS GENERALIZED: ICD-10-CM

## 2019-10-03 PROCEDURE — 99223 1ST HOSP IP/OBS HIGH 75: CPT | Performed by: HOSPITALIST

## 2019-10-03 PROCEDURE — 73502 X-RAY EXAM HIP UNI 2-3 VIEWS: CPT | Performed by: EMERGENCY MEDICINE

## 2019-10-03 PROCEDURE — 72110 X-RAY EXAM L-2 SPINE 4/>VWS: CPT | Performed by: EMERGENCY MEDICINE

## 2019-10-03 PROCEDURE — 73560 X-RAY EXAM OF KNEE 1 OR 2: CPT | Performed by: EMERGENCY MEDICINE

## 2019-10-03 RX ORDER — FENTANYL 100 UG/H
1 PATCH TRANSDERMAL
Status: DISCONTINUED | OUTPATIENT
Start: 2019-10-03 | End: 2019-10-06

## 2019-10-03 RX ORDER — NICOTINE 21 MG/24HR
1 PATCH, TRANSDERMAL 24 HOURS TRANSDERMAL EVERY 24 HOURS
Status: DISCONTINUED | OUTPATIENT
Start: 2019-10-03 | End: 2019-10-06

## 2019-10-03 RX ORDER — POTASSIUM CHLORIDE 20 MEQ/1
40 TABLET, EXTENDED RELEASE ORAL EVERY 4 HOURS
Status: COMPLETED | OUTPATIENT
Start: 2019-10-03 | End: 2019-10-04

## 2019-10-03 RX ORDER — MORPHINE SULFATE 4 MG/ML
4 INJECTION, SOLUTION INTRAMUSCULAR; INTRAVENOUS ONCE
Status: COMPLETED | OUTPATIENT
Start: 2019-10-03 | End: 2019-10-03

## 2019-10-03 RX ORDER — FUROSEMIDE 20 MG/1
20 TABLET ORAL DAILY PRN
Status: DISCONTINUED | OUTPATIENT
Start: 2019-10-03 | End: 2019-10-04

## 2019-10-03 RX ORDER — SENNOSIDES 8.6 MG
8.6 TABLET ORAL 2 TIMES DAILY
Status: DISCONTINUED | OUTPATIENT
Start: 2019-10-03 | End: 2019-10-06

## 2019-10-03 RX ORDER — ACETAMINOPHEN 325 MG/1
650 TABLET ORAL EVERY 6 HOURS PRN
Status: DISCONTINUED | OUTPATIENT
Start: 2019-10-03 | End: 2019-10-06

## 2019-10-03 RX ORDER — MORPHINE SULFATE 4 MG/ML
4 INJECTION, SOLUTION INTRAMUSCULAR; INTRAVENOUS EVERY 2 HOUR PRN
Status: DISCONTINUED | OUTPATIENT
Start: 2019-10-03 | End: 2019-10-06

## 2019-10-03 RX ORDER — METOCLOPRAMIDE HYDROCHLORIDE 5 MG/ML
5 INJECTION INTRAMUSCULAR; INTRAVENOUS EVERY 8 HOURS PRN
Status: DISCONTINUED | OUTPATIENT
Start: 2019-10-03 | End: 2019-10-06

## 2019-10-03 RX ORDER — METHADONE HYDROCHLORIDE 10 MG/1
50 TABLET ORAL EVERY 8 HOURS
COMMUNITY

## 2019-10-03 RX ORDER — ENOXAPARIN SODIUM 100 MG/ML
40 INJECTION SUBCUTANEOUS NIGHTLY
Status: DISCONTINUED | OUTPATIENT
Start: 2019-10-03 | End: 2019-10-06

## 2019-10-03 RX ORDER — AMITRIPTYLINE HYDROCHLORIDE 50 MG/1
150 TABLET, FILM COATED ORAL NIGHTLY
Status: DISCONTINUED | OUTPATIENT
Start: 2019-10-03 | End: 2019-10-06

## 2019-10-03 RX ORDER — GABAPENTIN 100 MG/1
200 CAPSULE ORAL 3 TIMES DAILY
COMMUNITY

## 2019-10-03 RX ORDER — MORPHINE SULFATE 4 MG/ML
2 INJECTION, SOLUTION INTRAMUSCULAR; INTRAVENOUS EVERY 2 HOUR PRN
Status: DISCONTINUED | OUTPATIENT
Start: 2019-10-03 | End: 2019-10-06

## 2019-10-03 RX ORDER — BETHANECHOL CHLORIDE 10 MG/1
10 TABLET ORAL 3 TIMES DAILY
Refills: 0 | COMMUNITY
Start: 2019-08-20

## 2019-10-03 RX ORDER — ONDANSETRON 2 MG/ML
4 INJECTION INTRAMUSCULAR; INTRAVENOUS EVERY 6 HOURS PRN
Status: DISCONTINUED | OUTPATIENT
Start: 2019-10-03 | End: 2019-10-06

## 2019-10-03 RX ORDER — SODIUM CHLORIDE 9 MG/ML
INJECTION, SOLUTION INTRAVENOUS CONTINUOUS
Status: ACTIVE | OUTPATIENT
Start: 2019-10-03 | End: 2019-10-03

## 2019-10-03 RX ORDER — MELATONIN
1 DAILY
COMMUNITY
Start: 2019-10-02

## 2019-10-03 RX ORDER — MELATONIN
325 DAILY
Status: DISCONTINUED | OUTPATIENT
Start: 2019-10-03 | End: 2019-10-06

## 2019-10-03 RX ORDER — NALOXONE HYDROCHLORIDE 4 MG/.1ML
4 SPRAY, METERED NASAL AS NEEDED
Status: DISCONTINUED | OUTPATIENT
Start: 2019-10-03 | End: 2019-10-04

## 2019-10-03 RX ORDER — GABAPENTIN 100 MG/1
200 CAPSULE ORAL 3 TIMES DAILY
Status: DISCONTINUED | OUTPATIENT
Start: 2019-10-03 | End: 2019-10-06

## 2019-10-03 RX ORDER — TIZANIDINE 4 MG/1
8 TABLET ORAL 3 TIMES DAILY
Status: DISCONTINUED | OUTPATIENT
Start: 2019-10-03 | End: 2019-10-06

## 2019-10-03 RX ORDER — HYDROCODONE BITARTRATE AND ACETAMINOPHEN 5; 325 MG/1; MG/1
1 TABLET ORAL ONCE
Status: COMPLETED | OUTPATIENT
Start: 2019-10-03 | End: 2019-10-03

## 2019-10-03 RX ORDER — CALCITRIOL 0.25 UG/1
0.5 CAPSULE, LIQUID FILLED ORAL DAILY
Status: DISCONTINUED | OUTPATIENT
Start: 2019-10-03 | End: 2019-10-06

## 2019-10-03 RX ORDER — PANTOPRAZOLE SODIUM 20 MG/1
20 TABLET, DELAYED RELEASE ORAL
Status: DISCONTINUED | OUTPATIENT
Start: 2019-10-04 | End: 2019-10-06

## 2019-10-03 RX ORDER — METHADONE HYDROCHLORIDE 10 MG/1
50 TABLET ORAL EVERY 8 HOURS
Status: DISCONTINUED | OUTPATIENT
Start: 2019-10-03 | End: 2019-10-06

## 2019-10-03 RX ORDER — BETHANECHOL CHLORIDE 10 MG/1
10 TABLET ORAL 3 TIMES DAILY
Status: DISCONTINUED | OUTPATIENT
Start: 2019-10-03 | End: 2019-10-06

## 2019-10-03 NOTE — ED NOTES
Report to Nader Bailon. Pt updated on admit and plan of care. Transport here with patient. Pt tolerated crackers and juice. Belongings, purse, phone sent with pt to floor.

## 2019-10-03 NOTE — PLAN OF CARE
Admission plan of care discussed with patient. Bunny boots placed. Warm blankets given. Safety precautions and goals discussed.

## 2019-10-03 NOTE — ED NOTES
Patient is resting comfortably. Gio Steen, , at bedside several times updating patient on possible NH/Rehab placement. Pt in position of comfort. Verbalizes understanding. Drinking water.   Aware of need for UA sample

## 2019-10-03 NOTE — CM/SW NOTE
Abril Clark from PT states PT staff has been booked for the rest of the day and they can see the patient first thing tomorrow morning.

## 2019-10-03 NOTE — H&P
[facility]  History and Physical     Kenroy Son Patient Status:  Inpatient    1960 MRN [id number]   [facility] 3SW-A Attending Aniya Ashley MD   Hosp Day # 0 PCP Derek Proctor     Chief Complaint: weakness    History MG/24HR Transdermal Patch 24 Hr Place 1 patch onto the skin daily. Disp: 7 patch Rfl: 0   calcitRIOL 0.5 MCG Oral Cap Take 0.5 mcg by mouth daily. Disp:  Rfl:    furosemide 20 MG Oral Tab Take 20 mg by mouth daily as needed (swelling).    Disp:  Rfl:    ome .0       Recent Labs   Lab 10/03/19  1237   *   BUN 8   CREATSERUM 0.44*   GFRAA 128   GFRNAA 111   CA 9.5      K 3.5      CO2 30.0       Estimated Creatinine Clearance: 83.9 mL/min (A) (based on SCr of 0.44 mg/dL (L)).     No re

## 2019-10-03 NOTE — ED NOTES
Pt tearful asking for pain meds. Pt not wanting to be admitted. Dr Cruz Bears at bedside speaking with patient. Update given on plan of care.   Discussed pain med management with MD

## 2019-10-03 NOTE — PROGRESS NOTES
NURSING ADMISSION NOTE      Patient admitted via cart from ED. Oriented to room. Safety precautions initiated. Bed in low position. Call light in reach. VSS. Hospitalist seen pt in ED, aware of arrival.  Pt c/o pain 8/10, chronic per pt.  Declin

## 2019-10-03 NOTE — ED INITIAL ASSESSMENT (HPI)
Slip and fall today. Unable to get self off floor. EMS for lift assist and c/o right lower back pain and bilat both legs.

## 2019-10-03 NOTE — PLAN OF CARE
Pt Aox4. R.A. C/o severe pain to right shoulder and back. IV pain meds given. Contractures to all extremities with limited movement chronically. Pt normally uses crutches at home, nothing at brought in. Pt also uses glasses which are at home.   Son will

## 2019-10-03 NOTE — CM/SW NOTE
Asked to speak to patient about her recurrent falls and her living condition. Patient states that she has been progressively getting weaker. Her legs aren't as strong. Per ems they have been going to her apartment very frequently to help her with lyfts.  Sh

## 2019-10-03 NOTE — ED PROVIDER NOTES
Patient Seen in: [facility] Emergency Department      History   Patient presents with:  Back Pain (musculoskeletal)    Stated Complaint: back pain    HPI    14-year-old female with history of frequent falls secondary rheumatoid arthritis, multiple o HPI.  Constitutional and vital signs reviewed. All other systems reviewed and negative except as noted above.     Physical Exam     ED Triage Vitals [10/03/19 1008]   BP 93/43   Pulse 92   Resp 18   Temp 97.8 °F (36.6 °C)   Temp src Temporal   SpO2 96 Result Value    Glucose 101 (*)     Creatinine 0.44 (*)     All other components within normal limits   URINALYSIS WITH CULTURE REFLEX - Abnormal; Notable for the following components:    Leukocyte Esterase Urine Small (*)     WBC Urine 5-10 (*) (primary encounter diagnosis)  Weakness generalized  Contusion of right hip, initial encounter    Disposition:  Admit  10/3/2019  1:35 pm    Follow-up:  Nino Corley  [address]    Schedule an appointme

## 2019-10-04 ENCOUNTER — APPOINTMENT (OUTPATIENT)
Dept: CT IMAGING | Facility: HOSPITAL | Age: 59
DRG: 547 | End: 2019-10-04
Attending: HOSPITALIST
Payer: MEDICAID

## 2019-10-04 PROCEDURE — 70450 CT HEAD/BRAIN W/O DYE: CPT | Performed by: HOSPITALIST

## 2019-10-04 PROCEDURE — 99232 SBSQ HOSP IP/OBS MODERATE 35: CPT | Performed by: HOSPITALIST

## 2019-10-04 RX ORDER — NALOXONE HYDROCHLORIDE 0.4 MG/ML
0.4 INJECTION, SOLUTION INTRAMUSCULAR; INTRAVENOUS; SUBCUTANEOUS AS NEEDED
Status: DISCONTINUED | OUTPATIENT
Start: 2019-10-04 | End: 2019-10-06

## 2019-10-04 NOTE — CM/SW NOTE
Met with pt to discuss DC planning. Pt is familiar to me from previous hospital admission. Pt is a 62 y/o woman with history of dwarfism and juvenile arthritis. She lives in her own apartment and her son visits her in the evenings to help.   Pt's son is options and contact SW with her preference. / to remain available for support and/or discharge planning.      Liyah Puente hospitalsJANETTE  Discharge Planner  [phone], pager [phone]       10/04/19 1100   CM/NATASHA Referral Data   Referr

## 2019-10-04 NOTE — CONSULTS
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 ROM diffusely impaired   Left Upper Extremity:  Strength is 2-3 with multiple contractures. ROM diffusely impaired   Right Lower Extremity:  Strength  is 2-3 but limited by contractures. .   ROM multiple contracture   Left Lower Extremity: S

## 2019-10-04 NOTE — PLAN OF CARE
Pt is alert x 4, on Ra, PT denies any cardiovascular symptoms at this time. Pt is up with total assist continent of B/B. Pt bilateral toes are black, pt stated she was unaware her toes were black. All needs met and will continue to monitor.        PLAN; psy interventions unsuccessful or patient reports new pain  - Anticipate increased pain with activity and pre-medicate as appropriate  Outcome: Progressing     Problem: SAFETY ADULT - FALL  Goal: Free from fall injury  Description  INTERVENTIONS:  - Assess pt

## 2019-10-04 NOTE — CM/SW NOTE
Spoke with Lewis Witt from PT who is recommending LTC placement vs home with 24 hour supervision. Met with pt to discuss DC needs. Pt stated that she does not want to go to NH at this time and prefers DC to home.   Discussed recommendation for 24 hour assistan

## 2019-10-04 NOTE — PROGRESS NOTES
[facility]  Progress Note     Amarilis People Patient Status:  Inpatient    1960 MRN [id number]   [facility] 3SW-A Attending Vernell Spencer MD   Hosp Day # 2 PCP Elle Menon     Chief Complaint: weakness    S: Patient wants 1. Weakness  1. PT eval  2. JRA  3. Fibromyalgia  4. Chronic pain    Plan of care: as above. Despite multiple discussions pt does not want to go to rehab or LTC.   Ok to DC    Quality:  · DVT Prophylaxis: lovneox  · CODE status: full  · Canas: no  · Kamryn

## 2019-10-04 NOTE — PLAN OF CARE
Pt has been reporting moderate pain to her shoulder neck area, Medicated as scheduled. VS remain stable,has been up with moderate to max assistance to get up and to the chair using crutches,  not able to walk further.  Patient has been refusing JEANNETTE samuels

## 2019-10-04 NOTE — PAYOR COMM NOTE
--------------  ADMISSION REVIEW     Payor: Sadi Wolfe #:  [id number]  Authorization Number: [id number]    Admit date: 10/3/19  Admit time: 26       Admitting Physician: Ki Spain MD  Attending Physician:  Ki Spain MD  Primary Care P replacements x2     Review of Systems  Positive for stated complaint: back pain  Other systems are as noted in HPI. Constitutional and vital signs reviewed. All other systems reviewed and negative except as noted above.     Physical Exam     ED Triage Reviewed   BASIC METABOLIC PANEL (8) - Abnormal; Notable for the following components:       Result Value    Glucose 101 (*)     Creatinine 0.44 (*)     All other components within normal limits   CBC W/ DIFFERENTIAL - Abnormal; Notable for the following c Complaint: weakness    History of Present Illness: Susanna Hermosillo is a 61year old female with a past medical history of JRA, GERD, fibromyalgia and chronic pain. She has had ongoing issues with weakness. She has had EMS out to her house multiple times. mouth nightly. Disp:  Rfl:      Review of Systems:   A comprehensive 14 point review of systems was completed. Pertinent positives and negatives noted in the HPI.     Physical Exam:    /84 (BP Location: Right arm)   Pulse 100   Temp 98.3 °F (36.8 Consultation: Impaired ADL and mobility dysfuction due to multiple falls and lady with JRA    ASSESSMENT:  Impaired mobility and self-care secondary to juvenile rheumatoid arthritis, chronic pain syndrome, deconditioning.     PLAN:               Will have fentaNYL (DURAGESIC) 100 MCG/HR 1 patch     Date Action Dose Route User    10/3/2019 1923 Patch Applied 1 patch Transdermal (Left Upper Arm) Naina Ramirez RN      ferrous sulfate EC tab 325 mg     Date Action Dose Route User    10/4/2019 0946 Given Date Action Dose Route User    10/4/2019 0946 Given 8 mg Oral Mic Lenz RN    10/3/2019 1957 Given 8 mg Oral Narendra Hernandez RN          REVIEWER COMMENTS:     FOR REVIEW/APPROVAL OF INPT ADMISSION

## 2019-10-05 ENCOUNTER — APPOINTMENT (OUTPATIENT)
Dept: MRI IMAGING | Facility: HOSPITAL | Age: 59
DRG: 547 | End: 2019-10-05
Attending: HOSPITALIST
Payer: MEDICAID

## 2019-10-05 PROBLEM — M08.00 JRA (JUVENILE RHEUMATOID ARTHRITIS) (HCC): Status: ACTIVE | Noted: 2019-08-01

## 2019-10-05 PROCEDURE — 72148 MRI LUMBAR SPINE W/O DYE: CPT | Performed by: HOSPITALIST

## 2019-10-05 PROCEDURE — 99233 SBSQ HOSP IP/OBS HIGH 50: CPT | Performed by: HOSPITALIST

## 2019-10-05 NOTE — PLAN OF CARE
Pain well controlled with scheduled oral medication. Pt dtv, states no urge to urinate, refused to get-up to use bsc to try if she's able to go. Bladder scan was 426 ml.  Straight cath done, obtained  575 brandi colored urine.  Pt c/o rt shoulder pain, state

## 2019-10-05 NOTE — BH PROGRESS NOTE
[facility] Resource Referral Counselor Note    Pedro Dawn Patient Status:  Inpatient    1960 MRN [id number]   [facility] 3SW-A Attending Pernell Castleman, MD   [facility] Day # 2 PCP Gio HAYES(subjective): Pt says th

## 2019-10-05 NOTE — CM/SW NOTE
SW spoke with pt regarding d/c planning. Pt reported to SW that she was unaware of her discharge date. SW explained she may d/c today, but it is pending. Pt reports she will be going home, and is refusing LTC.     SW explained PT is not recommending [facility],

## 2019-10-05 NOTE — OCCUPATIONAL THERAPY NOTE
OCCUPATIONAL THERAPY QUICK EVALUATION - INPATIENT    Room Number: 366/366-A  Evaluation Date: 10/5/2019     Type of Evaluation: Initial  Presenting Problem: Fall    Physician Order: IP Consult to Occupational Therapy  Reason for Therapy:  ADL/IADL Dysfunct evening, CG 5 hoursx 4 day)    Toilet and Equipment: Standard height toilet  Shower/Tub and Equipment: Walk-in shower; Shower chair  Other Equipment: None    Occupation/Status: not working     Drives: No  Patient Regularly Uses: None    Prior Level of [employer] How much help from another person does the patient currently need…  -   Putting on and taking off regular lower body clothing?: Total   -   Bathing (including washing, rinsing, drying)?: A Lot  -   Toileting, which includes using toilet, bedpan or urinal showing that score of 13.95 indicating SNF/IRF, score of 11.25 indicating LTAC).      In this OT evaluation patient presents with the following performance deficits: decreased dynamic stand balance, decreased endurance/generalized strength, increased pain i

## 2019-10-05 NOTE — PROGRESS NOTES
[facility]  Progress Note     Blue Verma Patient Status:  Inpatient    1960 MRN [id number]   [facility] 3SW-A Attending Patrecia Cooks, MD   [address] Day # 2 PCP Una Prayer     Chief Complaint: weakness    S: Patient is ret 1. Weakness  1. PT  2. JEANNETTE recommended, pt refused  3. LTC recommended, pt refused  2. Urinary Retention  1. Chronic issue  2. Cont. bethanachol  3. Discussed at length w/ pt and family. Agreeable to cruz now  4. ? Etiology for acute worsening.   Coul

## 2019-10-06 VITALS
RESPIRATION RATE: 16 BRPM | WEIGHT: 85 LBS | HEIGHT: 55 IN | HEART RATE: 85 BPM | DIASTOLIC BLOOD PRESSURE: 61 MMHG | OXYGEN SATURATION: 93 % | TEMPERATURE: 99 F | SYSTOLIC BLOOD PRESSURE: 120 MMHG | BODY MASS INDEX: 19.67 KG/M2

## 2019-10-06 PROCEDURE — 99239 HOSP IP/OBS DSCHRG MGMT >30: CPT | Performed by: HOSPITALIST

## 2019-10-06 NOTE — PLAN OF CARE
Pt impulsive with movement, up to chair with crutches and assist of 2 people for safety. Hands and feet are contracted. Urinary retention persisted today. Pt agreeable to cruz catheter placement this afternoon.  Cruz catheter placed with 750 ml of clear y

## 2019-10-06 NOTE — PLAN OF CARE
Pt is AAOX4, generalized weakness, has physical impairments that make ambulating more difficult at base line. Cruz remains intact and draining. Plan is to D/C with cruz and flup as outpt with urology. MRI was completed at start of shift.  Pt is up with mo

## 2019-10-06 NOTE — PLAN OF CARE
Pt seen by Dr. Rita Nance this am.  OK for dc to home today. Canas intact and patent. Pt will follow up with urology next week. Waiting for sister for ride home. Will continue to monitor.

## 2019-10-06 NOTE — PLAN OF CARE
Pt family instructed on care of cruz cath. Sister verbalized understanding and return demonstration. Pt and sister verbalized  understanding of  dc instructions. Left hospital at 1430 as per order. No new meds.

## 2019-10-07 NOTE — PAYOR COMM NOTE
--------------  DISCHARGE REVIEW    Payor: Cristofer Castroperla #:  [id number]  Authorization Number: [id number]    Admit date: 10/3/19  Admit time:  1559  Discharge Date: 10/6/2019  2:52 PM     Admitting Physician: Maximiliano Collins MD  Attending Physician: the care of her sister. She will follow-up with urology as an outpatient. Lace+ Score: 59  59-90 High Risk  29-58 Medium Risk  0-28   Low Risk      TCM Follow-Up Recommendation:  LACE > 58:  High Risk of readmission after discharge from the hospital. Juni 23  [phone]    Schedule an appointment as soon as possible for a visit  urinary retention    Vital signs:  Temp:  [98.5 °F (36.9 °C)] 98.5 °F (36.9 °C)  Pulse:  [85] 85  Resp:  [16] 16  BP: (120)/(61) 120/61    Physical Exam:

## 2019-10-07 NOTE — DISCHARGE SUMMARY
[facility]OSPITALIST  DISCHARGE SUMMARY     Margot Sanchez Patient Status:  Inpatient    1960 MRN [id number]   [facility] 3SW-A Attending No att. providers found   Hosp Day # 3 PCP Ashlee Rosado     Date of Admission: 10/3/2019  Date of For urinary incontinence   Refills:  0     calcitRIOL 0.5 MCG Caps  Commonly known as:  ROCALTROL      Take 0.5 mcg by mouth daily. Refills:  0     fentaNYL 100 MCG/HR Pt72  Commonly known as:  [address] 1 patch onto the skin every third day. 85  Resp:  [16] 16  BP: (120)/(61) 120/61    Physical Exam:    General: No acute distress. Respiratory: Clear to auscultation bilaterally. No wheezes. No rhonchi. Cardiovascular: S1, S2. No rubs, no JVD  Abdomen: Soft, nontender, nondistended.   Positive

## 2019-10-08 ENCOUNTER — HOSPITAL ENCOUNTER (EMERGENCY)
Facility: HOSPITAL | Age: 59
Discharge: HOME | End: 2019-10-08
Attending: EMERGENCY MEDICINE
Payer: MEDICAID

## 2019-10-08 VITALS
WEIGHT: 80 LBS | SYSTOLIC BLOOD PRESSURE: 102 MMHG | RESPIRATION RATE: 16 BRPM | BODY MASS INDEX: 18.52 KG/M2 | OXYGEN SATURATION: 96 % | TEMPERATURE: 98 F | HEIGHT: 55 IN | DIASTOLIC BLOOD PRESSURE: 55 MMHG | HEART RATE: 90 BPM

## 2019-10-08 DIAGNOSIS — Z97.8 FOLEY CATHETER PRESENT: Primary | ICD-10-CM

## 2019-10-08 PROCEDURE — 99281 EMR DPT VST MAYX REQ PHY/QHP: CPT

## 2019-10-08 NOTE — ED INITIAL ASSESSMENT (HPI)
Pt  had a cruz placed here at [facility] inpatient for urinary retention, pt requesting the catheter be removed because \"it's bothering me\".    has not seen her PMD or urologist.

## 2019-10-08 NOTE — ED PROVIDER NOTES
Patient Seen in: [facility] Emergency Department      History   Patient presents with:  Cath Tube Problem (gastrointestinal, urinary, integumentary)    Stated Complaint: requesting cruz cath be removed, pt with limited mobility    HPI    59-year-ol None (Room air)       Current:/56   Pulse 104   Temp 97.9 °F (36.6 °C) (Oral)   Resp 16   Ht 114.3 cm (3' 9\")   Wt 36.3 kg   SpO2 96%   BMI 27.78 kg/m²         Physical Exam    HEENT; NCAT, EOMI, throat clear, neck supple, no LAD, no JVD  Heart S1S2

## 2019-10-11 ENCOUNTER — APPOINTMENT (OUTPATIENT)
Dept: GENERAL RADIOLOGY | Facility: HOSPITAL | Age: 59
DRG: 194 | End: 2019-10-11
Attending: EMERGENCY MEDICINE
Payer: MEDICAID

## 2019-10-11 ENCOUNTER — HOSPITAL ENCOUNTER (INPATIENT)
Facility: HOSPITAL | Age: 59
LOS: 1 days | Discharge: HOME HEALTH | DRG: 194 | End: 2019-10-15
Attending: EMERGENCY MEDICINE | Admitting: HOSPITALIST
Payer: MEDICAID

## 2019-10-11 DIAGNOSIS — R53.1 WEAKNESS GENERALIZED: Primary | ICD-10-CM

## 2019-10-11 DIAGNOSIS — J18.9 COMMUNITY ACQUIRED PNEUMONIA, UNSPECIFIED LATERALITY: ICD-10-CM

## 2019-10-11 PROCEDURE — 71045 X-RAY EXAM CHEST 1 VIEW: CPT | Performed by: EMERGENCY MEDICINE

## 2019-10-11 PROCEDURE — 73502 X-RAY EXAM HIP UNI 2-3 VIEWS: CPT | Performed by: EMERGENCY MEDICINE

## 2019-10-11 RX ORDER — SODIUM CHLORIDE 9 MG/ML
INJECTION, SOLUTION INTRAVENOUS CONTINUOUS
Status: DISCONTINUED | OUTPATIENT
Start: 2019-10-11 | End: 2019-10-14

## 2019-10-12 ENCOUNTER — APPOINTMENT (OUTPATIENT)
Dept: GENERAL RADIOLOGY | Facility: HOSPITAL | Age: 59
DRG: 194 | End: 2019-10-12
Attending: INTERNAL MEDICINE
Payer: MEDICAID

## 2019-10-12 PROBLEM — J18.9 COMMUNITY ACQUIRED PNEUMONIA, UNSPECIFIED LATERALITY: Status: ACTIVE | Noted: 2019-10-12

## 2019-10-12 PROCEDURE — 99220 INITIAL OBSERVATION CARE,LEVL III: CPT | Performed by: HOSPITALIST

## 2019-10-12 PROCEDURE — 73030 X-RAY EXAM OF SHOULDER: CPT | Performed by: INTERNAL MEDICINE

## 2019-10-12 RX ORDER — BETHANECHOL CHLORIDE 10 MG/1
10 TABLET ORAL 3 TIMES DAILY
Status: DISCONTINUED | OUTPATIENT
Start: 2019-10-12 | End: 2019-10-12

## 2019-10-12 RX ORDER — IBUPROFEN 600 MG/1
600 TABLET ORAL EVERY 4 HOURS PRN
Status: DISCONTINUED | OUTPATIENT
Start: 2019-10-12 | End: 2019-10-15

## 2019-10-12 RX ORDER — ACETAMINOPHEN 500 MG
TABLET ORAL
Status: COMPLETED
Start: 2019-10-12 | End: 2019-10-12

## 2019-10-12 RX ORDER — GABAPENTIN 100 MG/1
200 CAPSULE ORAL ONCE
Status: COMPLETED | OUTPATIENT
Start: 2019-10-12 | End: 2019-10-12

## 2019-10-12 RX ORDER — METHADONE HYDROCHLORIDE 10 MG/1
50 TABLET ORAL ONCE
Status: COMPLETED | OUTPATIENT
Start: 2019-10-12 | End: 2019-10-12

## 2019-10-12 RX ORDER — ACETAMINOPHEN 325 MG/1
650 TABLET ORAL EVERY 6 HOURS PRN
Status: DISCONTINUED | OUTPATIENT
Start: 2019-10-12 | End: 2019-10-15

## 2019-10-12 RX ORDER — TIZANIDINE 4 MG/1
8 TABLET ORAL 3 TIMES DAILY
Status: DISCONTINUED | OUTPATIENT
Start: 2019-10-12 | End: 2019-10-15

## 2019-10-12 RX ORDER — NICOTINE 21 MG/24HR
1 PATCH, TRANSDERMAL 24 HOURS TRANSDERMAL DAILY
Status: DISCONTINUED | OUTPATIENT
Start: 2019-10-12 | End: 2019-10-15

## 2019-10-12 RX ORDER — MELATONIN
325 DAILY
Status: DISCONTINUED | OUTPATIENT
Start: 2019-10-12 | End: 2019-10-15

## 2019-10-12 RX ORDER — GABAPENTIN 100 MG/1
200 CAPSULE ORAL 3 TIMES DAILY
Status: DISCONTINUED | OUTPATIENT
Start: 2019-10-12 | End: 2019-10-15

## 2019-10-12 RX ORDER — SENNOSIDES 8.6 MG
8.6 TABLET ORAL 2 TIMES DAILY
Status: DISCONTINUED | OUTPATIENT
Start: 2019-10-12 | End: 2019-10-15

## 2019-10-12 RX ORDER — AMITRIPTYLINE HYDROCHLORIDE 50 MG/1
150 TABLET, FILM COATED ORAL NIGHTLY
Status: DISCONTINUED | OUTPATIENT
Start: 2019-10-12 | End: 2019-10-15

## 2019-10-12 RX ORDER — TIZANIDINE 4 MG/1
8 TABLET ORAL ONCE
Status: COMPLETED | OUTPATIENT
Start: 2019-10-12 | End: 2019-10-12

## 2019-10-12 RX ORDER — CALCITRIOL 0.25 UG/1
0.5 CAPSULE, LIQUID FILLED ORAL DAILY
Status: DISCONTINUED | OUTPATIENT
Start: 2019-10-12 | End: 2019-10-15

## 2019-10-12 RX ORDER — METHADONE HYDROCHLORIDE 10 MG/1
50 TABLET ORAL EVERY 8 HOURS
Status: DISCONTINUED | OUTPATIENT
Start: 2019-10-12 | End: 2019-10-15

## 2019-10-12 RX ORDER — FENTANYL 100 UG/H
1 PATCH TRANSDERMAL
Status: DISCONTINUED | OUTPATIENT
Start: 2019-10-12 | End: 2019-10-15

## 2019-10-12 RX ORDER — IBUPROFEN 200 MG
200 TABLET ORAL EVERY 4 HOURS PRN
Status: DISCONTINUED | OUTPATIENT
Start: 2019-10-12 | End: 2019-10-15

## 2019-10-12 RX ORDER — PANTOPRAZOLE SODIUM 20 MG/1
20 TABLET, DELAYED RELEASE ORAL
Status: DISCONTINUED | OUTPATIENT
Start: 2019-10-12 | End: 2019-10-15

## 2019-10-12 RX ORDER — ENOXAPARIN SODIUM 100 MG/ML
40 INJECTION SUBCUTANEOUS DAILY
Status: DISCONTINUED | OUTPATIENT
Start: 2019-10-12 | End: 2019-10-15

## 2019-10-12 RX ORDER — IBUPROFEN 400 MG/1
400 TABLET ORAL EVERY 4 HOURS PRN
Status: DISCONTINUED | OUTPATIENT
Start: 2019-10-12 | End: 2019-10-15

## 2019-10-12 NOTE — ED NOTES
Patient given orange juice, declined sandwich but is eating vikas crackers. Denies new c/o and appears comfortable.

## 2019-10-12 NOTE — H&P
[facility]  History and Physical     [facility] People Patient Status:  Observation    1960 MRN [id number]   [facility] 5NW-A Attending Janice Villanueva MD   Hosp Day # 0 PCP Elle Menon     Chief Complaint: SOB, cough     His Cap, Take 200 mg by mouth 3 (three) times daily. , Disp: , Rfl:   ferrous sulfate 325 (65 FE) MG Oral Tab EC, Take 1 tablet by mouth daily. , Disp: , Rfl:   Methadone HCl 10 MG Oral Tab, Take 50 mg by mouth every 8 (eight) hours. , Disp: , Rfl:   fentaNYL 100 lesions. Psychiatric: Appropriate mood and affect.   Diagnostic Data:    Labs:  Recent Labs   Lab 10/11/19  2040 10/11/19  2041   WBC 6.3  --    HGB 11.8*  --    MCV 94.3  --    .0  --    INR  --  0.93     Recent Labs   Lab 10/11/19  2040   GLU 64*

## 2019-10-12 NOTE — ED INITIAL ASSESSMENT (HPI)
Patient arrives from home per EMS for evaluation of falls, generalized weakness, hypotension, and c/o foul smelling urine.  Recent cruz cath

## 2019-10-12 NOTE — PLAN OF CARE
NURSING ADMISSION NOTE      Patient admitted via Cart  Oriented to room 529. Safety and Fall precautions initiated. Bed in low position. Call light in reach.

## 2019-10-12 NOTE — ED PROVIDER NOTES
Patient Seen in: [facility] Emergency Department      History   Patient presents with:  Fatigue (constitutional, neurologic)    Stated Complaint: multiple falls, hypotension, and urinary c/o    HPI    Meraevelio Cardenas is a pleasant 49-year-old female presentin motion secondary to underlying condition but no tenderness to palpation    ED Course     Labs Reviewed   COMP METABOLIC PANEL (14) - Abnormal; Notable for the following components:       Result Value    Glucose 64 (*)     Creatinine 0.50 (*)     Albumin 3. indicative of possible infiltrate patient will be started course of advice will be admitted.   She has no signs of hypoxia              Disposition and Plan     Clinical Impression:  Weakness generalized  (primary encounter diagnosis)  Community acquired pn

## 2019-10-12 NOTE — ED NOTES
Patient is uncooperative. She did allow IV attempt but pulled away while Imelda Push RN attempted line, she demanded catheter be removed. She is insisting IV be started in same are but a MM over.  No vein felt in area she wanted and then stated \"well you aren't

## 2019-10-13 PROCEDURE — 99225 SUBSEQUENT OBSERVATION CARE: CPT | Performed by: INTERNAL MEDICINE

## 2019-10-13 PROCEDURE — 99223 1ST HOSP IP/OBS HIGH 75: CPT | Performed by: INTERNAL MEDICINE

## 2019-10-13 RX ORDER — CALCIUM CARBONATE 200(500)MG
500 TABLET,CHEWABLE ORAL
Status: DISCONTINUED | OUTPATIENT
Start: 2019-10-13 | End: 2019-10-15

## 2019-10-13 NOTE — PROGRESS NOTES
10/12/19 1926   Clinical Encounter Type   Visited With Patient and family together  (Son and girl friend at bedside)   Routine Visit Introduction  (AD requested. RN reported pt is decisional.  AD completed.   Original to pt and copy to the pt chart.)

## 2019-10-13 NOTE — PROGRESS NOTES
[facility]  Progress Note    Medhat White Patient Status:  Observation    1960 MRN [id number]   [facility] 5NW-A Attending Saige Connell MD   Hosp Day # 0 PCP Janett Myers     CC: Shortness of breath, cough, generalized weakn without obvious abnormality, atraumatic  Throat: oral mucosa moist  Neck: no adenopathy, no carotid bruit, no JVD  Lungs: clear to auscultation bilaterally  Heart: S1, S2 normal, no murmur,  regular rate and rhythm  Abdomen: soft, non-tender; bowel sounds consistent with severe chronic erosive arthropathy of the right glenohumeral joint. Correlate clinically for chronic synovitis, rheumatoid arthritis, neuropathic arthropathy.         Dictated by: Jewel Martinez MD on 10/12/2019 at 21:00       XR CHEST AP/P similar to prior exams. No new fracture is seen within the limitations of the exam.     =====  CONCLUSION:  Significantly limited exam due to severe diffuse osteopenia. No convincing evidence of hardware failure. No definite new fracture is seen.   If th and dehydration  1. Improving with IV fluids  2. Decrease rate of IV fluids and Hep-Lock later if eating well and tolerating this as discussed with RN at bedside  3. Await PT OT  4. Fall precautions  3.  Chronic pain due to advanced rheumatoid arthritis, fi

## 2019-10-13 NOTE — CONSULTS
[facility]  Rheumatology Report of Consultation  Douglas Dextermary Patient Status:  Observation    1960 MRN [id number]   [facility] 5NW-A Attending Gayla Saleh MD   Hosp Day # 0 PCP Carol Stone     Date of Admission: 10/11/2019 enough to require medication intervention. States her main concern is for frequent falls and dizziness/vertigo associated with the falls.      States that after dx of JRA, pt felt she went into remission in her teen's/20's and she felt the best she ever did 325 mg, 325 mg, Oral, Daily  gabapentin (NEURONTIN) cap 200 mg, 200 mg, Oral, TID  Methadone HCl (DOLOPHINE) tab 50 mg, 50 mg, Oral, Q8H  nicotine (NICODERM CQ) 14 MG/24HR 1 patch, 1 patch, Transdermal, Daily  Pantoprazole Sodium (PROTONIX) EC tab 20 mg, 2 normal, no murmur  Lungs: Clear to ausculation anteriorly. Normal excursions and effort. Abdomen: Soft, non-tender. Bowel sounds present. Extremities: Without clubbing, cyanosis or edema. Peripheral pulses are 2+.   Neurologic: Alert and oriented, mike chronic rheumatoid arthritis, neuropathic arthropathy etc.     =====  CONCLUSION:  Findings consistent with severe chronic erosive arthropathy of the right glenohumeral joint.   Correlate clinically for chronic synovitis, rheumatoid arthritis, neuropathic retention. Patient is unable to ambulate. FINDINGS:    LUMBAR DISC LEVELS  L1-L2:  No significant disc abnormality, spinal stenosis, or foraminal narrowing. L2-L3:  No significant disc abnormality, spinal stenosis, or foraminal narrowing.   L3-L4 images including fat suppression sequences. Images acquired in sagittal and axial planes. Intravenous gadolinium was administered followed by multiplanar post-infusion T1 weighted sequences.        PATIENT STATED HISTORY:(As transcribed by Technologist) the AP dimension at the C4-5 level over a 1 cm craniocaudad dimension length.   There is significant central cystic myelomalacia at this level with low T1 and high T2 and STIR signal measuring 1 x   0.2 x 0.3 cm in the greatest craniocaudad , AP and transve Approved by: Maria R Ortiz MD         Patient Active Problem List:     Weakness generalized     Dwarfism     Juvenile rheumatoid arthritis ([facility] [address]     Frequent falls     Gait instability     Hypokalemia     Acute subdural hematoma ([facility] [address]     Essential verbalized understanding of above instructions. No further questions at this time. Thank you for allowing me to participate in the care of your patient.     Beny Mccall, DO  [facility]  10/13/2019

## 2019-10-13 NOTE — PLAN OF CARE
Problem: Patient/Family Goals  Goal: Patient/Family Long Term Goal  Description  Patient's Long Term Goal: to be discharged    Interventions:  - Plan of care  - See additional Care Plan goals for specific interventions   Outcome: Progressing  Goal: Patie decreased sensation to BUE, upper dentures, glasses, pt reports R ear is [place].  monitoring in place and maintained. WNL on RA.  VSS, electrolyte protocol.   Decreased urinary frequency and last BM PTA, pt reports this is baseline but \"should probably t

## 2019-10-13 NOTE — PHYSICAL THERAPY NOTE
PHYSICAL THERAPY EVALUATION - INPATIENT     Room Number: 529/529-A  Evaluation Date: 10/13/2019  Type of Evaluation: Initial  Physician Order: PT Eval and Treat    Presenting Problem: Generalized weakness, CA-PNA and s/p fall  Reason for Therapy:  Mob • TOTAL HIP REPLACEMENT     • TOTAL KNEE REPLACEMENT         HOME SITUATION  Type of Home: Apartment   Home Layout: Other (Comment)(c elevator)                Lives With: Alone;Caregiver part-time; Other (Comment)(CG 5 hrs, 5 days/wk; son comes every ni ASSESSMENT  Upper extremity ROM and strength are: B shld AROM 0-85 deg; elbow contractures lacking ext present; B mod hand deformities present, able to do key grasp, however unable to do a pincer grasp (utilized built up utensils)    Lower extremity ROM is balance at the EOB c intermittent CGA when testing ROM/MMT. Pt then refused to attempt any further mobility stating she was unable to t/f with a RW and does not have her crutches present.  D/w the pt attempt to stand or squat pivot to a BSC and she was not care/supervision and HHPT when medically appropriate. Pt has been struggling at home and doesn't always have overlap of care she requires. Pt would benefit from a more supportive living environment or transitioning to LTC or prison.    DISCHARGE RECOMMENDATION

## 2019-10-13 NOTE — PROGRESS NOTES
Problem:  PNA , generalized weakness     Data: Pt A&Ox4. R ear hearing impaired . RA. . Pt voids , decreased frequency ( per pt this is baseline) . Repositioned in bed frequently . Pt up with standby w/ walker. Pt uses crutches at home. IVF . IV abx. Wilfred Pereira

## 2019-10-14 ENCOUNTER — APPOINTMENT (OUTPATIENT)
Dept: GENERAL RADIOLOGY | Facility: HOSPITAL | Age: 59
DRG: 194 | End: 2019-10-14
Attending: INTERNAL MEDICINE
Payer: MEDICAID

## 2019-10-14 ENCOUNTER — APPOINTMENT (OUTPATIENT)
Dept: MRI IMAGING | Facility: HOSPITAL | Age: 59
DRG: 194 | End: 2019-10-14
Attending: INTERNAL MEDICINE
Payer: MEDICAID

## 2019-10-14 PROCEDURE — 73552 X-RAY EXAM OF FEMUR 2/>: CPT | Performed by: INTERNAL MEDICINE

## 2019-10-14 PROCEDURE — 73221 MRI JOINT UPR EXTREM W/O DYE: CPT | Performed by: INTERNAL MEDICINE

## 2019-10-14 PROCEDURE — 71046 X-RAY EXAM CHEST 2 VIEWS: CPT | Performed by: INTERNAL MEDICINE

## 2019-10-14 PROCEDURE — 99225 SUBSEQUENT OBSERVATION CARE: CPT | Performed by: INTERNAL MEDICINE

## 2019-10-14 RX ORDER — DOCUSATE SODIUM 100 MG/1
100 CAPSULE, LIQUID FILLED ORAL 2 TIMES DAILY
Status: DISCONTINUED | OUTPATIENT
Start: 2019-10-14 | End: 2019-10-15

## 2019-10-14 RX ORDER — POLYETHYLENE GLYCOL 3350 17 G/17G
17 POWDER, FOR SOLUTION ORAL DAILY
Status: DISCONTINUED | OUTPATIENT
Start: 2019-10-14 | End: 2019-10-15

## 2019-10-14 RX ORDER — BISACODYL 10 MG
10 SUPPOSITORY, RECTAL RECTAL
Status: DISCONTINUED | OUTPATIENT
Start: 2019-10-14 | End: 2019-10-15

## 2019-10-14 RX ORDER — ALFUZOSIN HYDROCHLORIDE 10 MG/1
10 TABLET, EXTENDED RELEASE ORAL
Status: DISCONTINUED | OUTPATIENT
Start: 2019-10-14 | End: 2019-10-15

## 2019-10-14 NOTE — PROGRESS NOTES
At approx 0400- pt voided per bedpan with incontinence- PVR bladder scan shows >600ml, explained to pt urinary retention protocol- need to straight cath- pt asking to come back at 0500 to try to go again because \"no urge to go right now\" and refusing str

## 2019-10-14 NOTE — OCCUPATIONAL THERAPY NOTE
OCCUPATIONAL THERAPY EVALUATION - INPATIENT     Room Number: 529/529-A  Evaluation Date: 10/14/2019  Type of Evaluation: Initial  Presenting Problem: weakness    Physician Order: IP Consult to Occupational Therapy  Reason for Therapy: ADL/IADL Dysfunction Weakness generalized  Active Problems:    Juvenile rheumatoid arthritis ([facility] [address]    Community acquired pneumonia, unspecified laterality    Fibromyalgia    E. coli UTI      Past Medical History  Past Medical History:   Diagnosis Date   • Depression    • Esoph more when getting out of bed, than when getting into bed, and that most recent fall happened when bending over to plug in light plug while utilizing axillary crutches.        SUBJECTIVE   \"Sometime I use them, but nobody feeds me yet\" referring to using b grooming such as brushing teeth?: A Little  -   Eating meals?: A Little    AM-PAC Score:  Score: 13  Approx Degree of Impairment: 63.03%  Standardized Score (AM-PAC Scale): 32.03  CMS Modifier (G-Code): CL    FUNCTIONAL TRANSFER ASSESSMENT  Supine to Sit : participate in BADLs and transfers, instrumental activities of daily living, rest and sleep, work, leisure and social participation.      The patient is functioning below her previous functional level and would benefit from skilled inpatient OT to address t

## 2019-10-14 NOTE — PROGRESS NOTES
[facility]  Progress Note    Cami Noble Patient Status:  Observation    1960 MRN [id number]   [facility] 5NW-A Attending Fely Witt MD   Hosp Day # 0 PCP Soni Alexander     CC: Shortness of breath, cough, generalized weakn lesions  Neurologic: Awake,alert,nonfocal  Psych: Mood and affect appears normal      Labs:   Lab Results   Component Value Date    PGLU 88 10/14/2019     URINE CULTURE >100,000 CFU/ML Escherichia coliAbnormal           Resulting Agency: [employer] (As transcribed by Technologist)  Patient arrives from home per EMS for evaluation of falls, generalized weakness, hypotension, and c/o foul smelling urine.           FINDINGS:    Airspace opacity of the left lower lobe is new from prior exams and most like [place] , XR CHEST PA + LAT CHEST (CPT=71046), 9/15/2019, 15:55. TECHNIQUE:  PA and lateral chest radiographs were obtained.      PATIENT STATED HISTORY: (As transcribed by Technologist)  Patient states she at the hospital because she's been falling freq 1 patch, Transdermal, Daily  Pantoprazole Sodium (PROTONIX) EC tab 20 mg, 20 mg, Oral, QAM AC  Senna (SENOKOT) tab 8.6 mg, 8.6 mg, Oral, BID  tiZANidine HCl (ZANAFLEX) tab 8 mg, 8 mg, Oral, TID  CefTRIAXone Sodium (ROCEPHIN) 1 g in sodium chloride 0.9% 100 line: no    Estimated date of discharge: To be decided  Discharge is dependent on: Clinical progress, PT OT eval  At this point Ms. Caity Fink  is expected to be discharge to: 24 hr supervision at home with HHPTOT VS supportive living environment or transitioni

## 2019-10-14 NOTE — CM/SW NOTE
CARYN met with the patient at bedside to complete dc planning assessment. The patient lives alone but has a CG 5hrs per day 5 days per week.   She also has her son come over every night to assist.  The patient informed MSW that she feels she has adequate sup

## 2019-10-14 NOTE — PLAN OF CARE
Problem: Patient/Family Goals  Goal: Patient/Family Long Term Goal  Description  Patient's Long Term Goal: to be discharged    Interventions:  - Plan of care  - See additional Care Plan goals for specific interventions   Outcome: Progressing  Goal: Patie Encourage toileting schedule  Outcome: Progressing     Problem: GENITOURINARY - ADULT  Goal: Absence of urinary retention  Description  INTERVENTIONS:  - Assess patient’s ability to void and empty bladder  - Monitor intake/output and perform bladder scan a

## 2019-10-14 NOTE — PLAN OF CARE
Problem: Patient/Family Goals  Goal: Patient/Family Long Term Goal  Description  Patient's Long Term Goal: to be discharged    Interventions:  - Plan of care  - See additional Care Plan goals for specific interventions   Outcome: Progressing  Goal: Patie Encourage toileting schedule  Outcome: Progressing     Problem: Impaired Functional Mobility  Goal: Achieve highest/safest level of mobility/gait  Description  Interventions:  - Assess patient's functional ability and stability  - Promote increasing Aracelis Grise alarm on. Pt refused to be straight catheterized. Repeated post-void residual at 1425, was 285. Pt complained of constipation. Gave pt miralax. No c/o N/v.  Pt stable. WCTM.

## 2019-10-14 NOTE — HOME CARE LIAISON
10/14 1434: [id number] [facility] has accepted the patient, they will need final orders and updates. Annalisa Zuniga notified to follow up via page. 10/14 1317: Received home health referral from Ananlisa Zuniga.  Notified [facility] is not contracted with

## 2019-10-14 NOTE — PROGRESS NOTES
Problem:  PNA , generalized weakness     Data: Pt A&Ox4. R ear hearing impaired . RA. . Pt voids . Hx urinary retention , monitoring PVR. Repositioned in bed frequently . Pt up with standby w/ walker. Pt uses crutches at home. IVF . IV abx. .  Order/ set

## 2019-10-15 VITALS
OXYGEN SATURATION: 98 % | TEMPERATURE: 98 F | RESPIRATION RATE: 18 BRPM | SYSTOLIC BLOOD PRESSURE: 116 MMHG | HEART RATE: 76 BPM | BODY MASS INDEX: 32 KG/M2 | DIASTOLIC BLOOD PRESSURE: 78 MMHG | WEIGHT: 91.5 LBS

## 2019-10-15 PROCEDURE — 99217 OBSERVATION CARE DISCHARGE: CPT | Performed by: INTERNAL MEDICINE

## 2019-10-15 PROCEDURE — 99232 SBSQ HOSP IP/OBS MODERATE 35: CPT | Performed by: INTERNAL MEDICINE

## 2019-10-15 RX ORDER — AMOXICILLIN AND CLAVULANATE POTASSIUM 875; 125 MG/1; MG/1
1 TABLET, FILM COATED ORAL 2 TIMES DAILY
Qty: 12 TABLET | Refills: 0 | Status: SHIPPED | OUTPATIENT
Start: 2019-10-15 | End: 2019-10-21

## 2019-10-15 NOTE — PLAN OF CARE
Assumed care before midnight asleep in bed, easily awakes, no SOB noted, on O2 sat monitoring continuously, no desaturation. Abdomen is distended, soft, with good bowel sounds.   0230H Has not voided yet since yesterday at 1700H, offered bedpan but refused,

## 2019-10-15 NOTE — CM/SW NOTE
MSW sent final orders to [facility] and requested a SW to follow the patient at home for continued discussions for higher level of care such as MURTAZA.     [facility]:  [address] [place] [address] Miguel, DAMIEN

## 2019-10-15 NOTE — DIETARY NOTE
[facility]   CLINICAL NUTRITION    Marc Arellano     Admitting diagnosis:  Weakness generalized [R53.1]  Community acquired pneumonia, unspecified laterality [J18.9]    Ht:  3'9  Wt: 41.5 kg (91 lb 8 oz). Body mass index is 31.77 kg/m².   BMI calcula

## 2019-10-15 NOTE — PROGRESS NOTES
[facility]  Progress Note    Pedro Dawn Patient Status:  Observation    1960 MRN [id number]   [facility] 5NW-A Attending Jasiel Valle MD   Hosp Day # 1 PCP Gio Ahr     CC: Shortness of breath, cough, generalized weakn Awake,alert,nonfocal  Psych: Mood and affect appears normal      Labs:   Lab Results   Component Value Date    PGLU 115 10/15/2019     URINE CULTURE >100,000 CFU/ML Escherichia coliAbnormal           Resulting Agency: [employer] Technologist)  Patient arrives from home per EMS for evaluation of falls, generalized weakness, hypotension, and c/o foul smelling urine.           FINDINGS:    Airspace opacity of the left lower lobe is new from prior exams and most likely represents pneum LAT CHEST (QHL=62300), 9/15/2019, 15:55. TECHNIQUE:  PA and lateral chest radiographs were obtained. PATIENT STATED HISTORY: (As transcribed by Technologist)  Patient states she at the hospital because she's been falling frequently.  She says she wa Daily  Pantoprazole Sodium (PROTONIX) EC tab 20 mg, 20 mg, Oral, QAM AC  Senna (SENOKOT) tab 8.6 mg, 8.6 mg, Oral, BID  tiZANidine HCl (ZANAFLEX) tab 8 mg, 8 mg, Oral, TID  CefTRIAXone Sodium (ROCEPHIN) 1 g in sodium chloride 0.9% 100 mL MBP/ADD-vantage, 1 dependent on: Clinical progress, PT OT eval  At this point Ms. America Layne  is expected to be discharge to: 24 hr supervision at home with HHPTOT VS supportive living environment or transitioning to LTC or MURTAZA suggested by PT, SW for d/c if okay with        Amalia Adams

## 2019-10-15 NOTE — PAYOR COMM NOTE
--------------  CONTINUED STAY REVIEW    Payor: Jeanette Hicks #:  [id number]  Authorization Number: [id number]    Admit date: 10/12/19  Admit time: 2829    Admitting Physician: Migdalia Hermosillo MD  Attending Physician:  Safia Craven MD  Primary Care HHPTOT VS supportive living environment or transitioning to LTC or MURTAZA suggested by PT, SW for d/c planning    Questions/concerns and Plan of care were discussed with patient and RN   Terrence Lemon MD  10/14/2019    10/15/19:  Recommendations:   -- pt can calciTRIOL (ROCALTROL) cap 0.5 mcg     Date Action Dose Route User    10/15/2019 0841 Given 0.5 mcg Oral Waylon Kenney RN      CefTRIAXone Sodium (ROCEPHIN) 1 g in sodium chloride 0.9% 100 mL MBP/ADD-vantage     Date Action Dose Route User    10/14/ Hollie Sutherland, JOSE ENRIQUE      tiZANidine HCl (ZANAFLEX) tab 8 mg     Date Action Dose Route User    10/15/2019 1529 Given 8 mg Oral Silvia Pulse, RN    10/15/2019 0900 Given 8 mg Oral Silvia Pulse, RN    10/14/2019 2101 Given 8 mg Oral Mana Fuse

## 2019-10-15 NOTE — DISCHARGE SUMMARY
[facility]  Discharge Summary    Geno Soto Patient Status:  Inpatient    1960 MRN [id number]   [facility] 5NW-A Attending No att. providers found   Hosp Day # 1 MATHEW Hawley     Date of Admission: 10/11/2019    Date of consistent of severe chronic erosive arthropathy of right glenohumeral joint, seen by rheumatologist and had MRI of the right shoulder done on 10/14/2019 which showed Findings consistent with severe erosive and degenerative arthropathy most likely the effe Stop taking on:  October 21, 2019  Quantity:  12 tablet  Refills:  0        CONTINUE taking these medications      Instructions Prescription details   Amitriptyline HCl 150 MG Tabs  Commonly known as:  ELAVIL      Take 150 mg by mouth nightly.    Refills: prescription for each of these medications  · Amoxicillin-Pot Clavulanate 875-125 MG Tabs            Follow up Visits:  Follow-up with [facility] in 1 week    Olive Alberts  [address] [address] [phone]    In 1 week  H

## 2019-10-15 NOTE — PAYOR COMM NOTE
--------------  ADMISSION REVIEW     Payor: Cristofer Alberta #:  [id number]  Authorization Number: [id number]    Admit date: 10/12/19  Admit time: 5268       Admitting Physician: Nila Del Toro MD  Attending Physician:  Fely Witt MD  Primary Care P ED Triage Vitals [10/11/19 1954]   BP (!) 81/65   Pulse 57   Resp 20   Temp 97.2 °F (36.2 °C)   Temp src Temporal   SpO2 97 %   O2 Device None (Room air)     Current:/69   Pulse 71   Temp 97.2 °F (36.2 °C) (Temporal)   Resp 13   Wt 36.3 kg   SpO2 9 I reviewed the patient's laboratory tests radiographic testing with her. Patient is generally weak does have a caretaker only several hours of the day but otherwise is needing to be able to function on a independent level which she cannot do this time.   C Family History: History reviewed. No pertinent family history. Allergies: No Known Allergies  Medications:  No current facility-administered medications on file prior to encounter.    Bethanechol Chloride 10 MG Oral Tab, Take 10 mg by mouth 3 (three) times Cardiovascular: S1, S2. Regular rhythm. Regular rate. No murmurs, rubs or gallops. Equal pulses. Chest and Back: No tenderness or deformity. Abdomen: Soft, nontender, nondistended. Positive bowel sounds. No rebound or guarding.   Musculoskeletal: Moves She does not have obvious signs of active inflammation on her exam, however pt has hx of JRA. She is at risk of adult inflammatory arthropathy vs chronic changes from the previous inflammatory arthropathy of her JRA.  At this time, due to active infection a 0540-Given   2203-Given        2059-Given            azithromycin (ZITHROMAX) 500 mg in sodium chloride 0.9% 250 mL IVPB   Dose: 500 mg  Freq: Every 24 hours Route: IV  Last Dose: 500 mg (10/14/19 0412)  Start: 10/12/19 0500 End: 10/14/19 0897    Order s Dose: 325 mg  Freq: Daily Route: OR  Start: 10/12/19 0430     (0540)-Not Given          1005-Given            gabapentin (NEURONTIN) cap 200 mg   Dose: 200 mg  Freq: 3 times daily Route: OR  Start: 10/12/19 0900     1022-Given   1637-Given   2000-Given Start: 10/12/19 0240 End: 10/12/19 0309     0309-Given             Medications 10/11/19 10/12/19 10/13/19             Medications 10/11/19 10/12/19 10/13/19   sodium chloride 0.9% IV bolus 500 mL   Dose: 500 mL  Freq:  Once Route: IV  Last Dose: Stopped (10

## 2019-10-15 NOTE — PLAN OF CARE
0400H Very sleepy, Methadone not given. 20842D Offered bedpan again at this time, voided 300 ml of brandi clear urine, bladder scanned, noted with 500+ in bladder, still with urinary retention.  Refused straight catheterization,was becoming angry when attem

## 2019-10-15 NOTE — PROGRESS NOTES
[facility]  EMG Rheumatology Progress Note  Krysannamaria Mendoza Patient Status:  Inpatient    1960 MRN [id number]   [facility] 5NW-A Attending Mercy Castaneda MD   [facility] Day # 1 PCP Aman Romeo     Subjective:  Krysannamaria Mendoza is a(n) 5 no shoulder joint\" on the right side anymore. Denies obvious swelling of the joints but admits to morning stiffness throughout her body, particularly her hands which lasts for about 1 hour daily. Denies skin rash.  Admits to some dry eyes and dry mouth, wh restriction of right shoulder in all fields noted. Toes without synovitis but chronic ankle, foot and toe changes noted.      Labs:  Recent Labs   Lab 10/11/19  2040   RBC 3.83   HGB 11.8*   HCT 36.1   MCV 94.3   MCH 30.8   MCHC 32.7   RDW 12.6   NEPRELIM 3 atrophy of the supraspinatus, infraspinatus and subscapularis muscles. AC JOINT/ACROMION:  There is a type 2 acromion. The acromion is non eroded. BICEPS/LABRAL COMPLEX:  Not visualized for reasons stated below.     GLENOHUMERAL JOINT:  There are severe transcribed by Technologist)           FINDINGS:    BONES:  Knee arthroplasty. There are intramedullary rods and side plates transfixing multiple fractures of the femoral shaft. The bone is severely osteoporotic.   There are multiple fragments of bone in neuropathic arthropathy. Recommended orthopedic evaluation, however pt not interested in joint replacement. She can continue follow up with pain management.  Consider neurology follow up for neuropathy.      Recommendations:   -- no signs of active inflamma

## 2019-10-15 NOTE — PROGRESS NOTES
NURSING DISCHARGE NOTE    Discharged {DISCHARGED:4296} via {DISCHARGED VIA:4297}. Accompanied by {ACCOMPANIED BY:4298}  Belongings {BELONGINGS:4299}.

## 2019-10-16 NOTE — PROGRESS NOTES
NURSING DISCHARGE NOTE    Discharged Home via Wheelchair. Accompanied by Family member  Belongings Taken by patient/family. IV discontinued. VSS. Discharge instructions and prescriptions given to patient. She verbalized understanding.  All questions

## 2019-10-18 NOTE — PAYOR COMM NOTE
REQUESTING 1 INPT DAY    DISCHARGE REVIEW    Payor: MARTY  Subscriber #:  [id number]  Authorization Number: [id number]    Admit date: 10/14/19  Admit time:  4559  Discharge Date: 10/15/2019  6:48 PM     Admitting Physician: Jan Vann MD  Attending P vomiting. She denies dysuria hematuria. Please have a recent physical done by Dr. Reyes New for details of admission    Brief Synopsis:   Patient treated with IV antibiotics. Blood culture done on admission with no growth.   Urine culture done on adm TCM follow-up    Lace+ Score: 61  59-90 High Risk  29-58 Medium Risk  0-28   Low Risk. TCM Follow-Up Recommendation:Chikis Irving   LACE > 58:  High Risk of readmission after discharge from the hospital.      PCP: Ascencion Bose    Consultations:   Con dose.   Quantity:  1 kit  Refills:  0     nicotine 14 MG/24HR Pt24  Commonly known as:  NICODERM CQ      Place 1 patch onto the skin daily.    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

## 2022-11-14 NOTE — ED PROVIDER NOTES
Patient Seen in: [facility] Emergency Department    History   Patient presents with:  Fall (musculoskeletal, neurologic)    Stated Complaint: fall    HPI    Patient is a 59-year-old with a history of juvenile rheumatoid arthritis who presents for ev 03/31/17 2041 100.7 °F (38.2 °C)   Temp src 03/31/17 2041 Oral   SpO2 03/31/17 2041 95 %   O2 Device 03/31/17 2041 None (Room air)       Current:/87 mmHg  Pulse 91  Temp(Src) 100 °F (37.8 °C) (Oral)  Resp 16  Ht 114.3 cm (3' 9\")  Wt 44.906 kg  BMI 3 Status                     ---------                               -----------         ------                     CBC W/ DIFFERENTIAL[[id number]]          Abnormal            Final result                 Please view results for these tests on the indiv the pancreas. Moderate fluid in the stomach and food debris in the stomach. No gallbladder dilation or gallbladder wall thickening. The urinary bladder is quite   distended with urine.  Severe beam hardening artifact from bilateral hip arthroplasty.     Sco negative no gross abnormalities

## (undated) NOTE — IP AVS SNAPSHOT
[address]            (For Outpatient Use Only) Initial Admit Date: 8/6/2019   Inpt/Obs Admit Date: Inpt: N/A / Obs: 08/06/19   Discharge Date:    Huel Curling:  [de-identified]   MRN: [de-identified]   CSN: [id number]   CEID: [id number] Hospital Account Financial Class: Medicaid Advantage    August 9, 2019

## (undated) NOTE — IP AVS SNAPSHOT
Patient Demographics     Address  [address] [place] [address] Phone  [phone] [facility])  [phone] (Mobile) *Preferred* E-mail Address  [email or web address]      Emergency Contact(s)     Name Relation Home Work Mobile    Bryan Irving Next dose due:  @ bedtime      Take 60 mg by mouth nightly. Methadone HCl 10 MG Tabs  Commonly known as:  DOLOPHINE      Take 5 tablets (50 mg total) by mouth 2 (two) times daily with meals.    Ximena Milton MD         Naloxone HCl 4 MG/0.1ML Liqd [id number] Methadone HCl (DOLOPHINE) tab 60 mg 08/08/19 2124 Given      [id number] Pantoprazole Sodium (PROTONIX) EC tab 20 mg 08/09/19 0504 Given      [id number] acetaminophen (TYLENOL) tab 650 mg 08/08/19 1539 Given      [id number] acetaminophen (TYLENOL) t Location [address] [place] [address] 3SW-A Attending Masoud Mahoney [address] Day # 0 PCP [facility]     Chief Complaint: Frequent falls    History of Present Illness: Noemí Welch is a 61year old female with history of juvenile rheumatoid arthritis dis Disp:  Rfl:    Amitriptyline HCl 150 MG Oral Tab Take 150 mg by mouth nightly. Disp:  Rfl:        Review of Systems:   A comprehensive 14 point review of systems was completed. Pertinent positives and negatives noted in the HPI.     Physical Exam:    B ALT 14  --   --   --   --    BILT 0.5  --   --   --   --    TP 7.1  --   --   --   --        Estimated Creatinine Clearance: 99.4 mL/min (A) (based on SCr of 0.48 mg/dL (L)).     Recent Labs   Lab 08/01/19  0259 08/06/19  0107   PTP 14.0 14.0   INR 1.04 1.0 Admitting Diagnosis: Juvenile rheumatoid arthritis ([facility] [address] [M08.00]  Depressive disorder [F32.9]  Frequent falls [R29.6]  History of subdural hematoma [Z86.79]  Anxiety disorder, unspecified type [F41.9]    Subjective:      Reason for Consultation: Impaired Premorbid Functional Status: Patient was not independent with mobility/ambulation, transfers, ADL's, IADL's.  Used Crutches  Support System and Family Circumstances (i.e., potential caregivers): lives alone  Home Environment / Accessibility: 1-story house/ Methadone HCl (DOLOPHINE) tab 60 mg 60 mg Oral Nightly   Methadone HCl (DOLOPHINE) tab 50 mg 50 mg Oral BID with meals   [] Potassium Chloride ER (K-DUR) CR tab 40 mEq 40 mEq Oral Q4H       Social History    Tobacco Use      Smoking status: Current Left Lower Extremity: Strength  is 3-4. ROM limited. Neuro: CNII-XII are grossly intact.  Sensation to dull touch intact in all extremities and reflexes are 2+, bilateral and symmetric for biceps, brachioradialis, triceps, patella and achilles th Will follow. Thank you for the consult. Patrick Nascimento MD  [facility]. [DD.1]        Electronically signed by Sumaya Pizarro MD on 8/7/2019  3:45 PM   Attribution Meza    DD. 1 - Sumaya Pizarro MD on 8/7/2019  3:36 PM Physical Therapy Note signed by Verner Render, PT at 8/8/2019  9:59 AM  Version 1 of 1    Author:  Verner Render, PT Service:  Rehab Author Type:  Physical Therapist    Filed:  8/8/2019  9:59 AM Date of Service:  8/8/2019  9:45 AM Status:  Signed present.     Patient’s self-stated goal is to be independent & go back to her apartment,    OBJECTIVE[NP.1]  Precautions: Bed/chair alarm(Needs small size chair & low bed)[NP.2]    WEIGHT BEARING RESTRICTION[NP.1]  Weight Bearing Restriction: None[NP.2] Comment : Above scores based on FIM definations[NP.2]    Skilled Therapy Provided: Patient was received in bed, Needed min assist to transfer to edge of bed. Needed min assist to perform sit to stand by sliding @ edge of bed & holding crutches.  Patient amb PT Treatment Plan: Bed mobility; Endurance; Energy conservation;Patient education;Gait training;Neuromuscular re-educate;Strengthening;Transfer training;Balance training  Rehab Potential : Good  Frequency (Obs): Daily[NP.2]  CURRENT GOALS     Goal #1 Patient density compared to the study of 8/2/2019. The component lateral to the right temporal and parietal region appears stable, minimal increase in the thickness adjacent to the right   frontal lobe anteriorly presently measures 4 mm, previously 3 mm.   There i WEIGHT BEARING RESTRICTION  Weight Bearing Restriction: None                PAIN ASSESSMENT  Ratin  Location: Headache - RN - Denton Canas was notified  Management Techniques: Breathing techniques;Relaxation;Repositioning    COGNITION  · Overall Cognitive S -   Moving from lying on back to sitting on the side of the bed?: A Lot   How much help from another person does the patient currently need. ..   -   Moving to and from a bed to a chair (including a wheelchair)?: A Lot   -   Need to walk in hospital room?: Patient is a 61year old female admitted on 8/6/2019 for S/p Fall on 08/06/19 - H/o fall on 07/31/19 sustained SDH - Was d/c to home. Pertinent comorbidities and personal factors impacting therapy include Juvenile RA,Dwarfism,Bilateral LATONYA & TKA,HTN.   In Goal #3 Patient is able to ambulate 25 feet with assist device: crutches (axillary) at assistance level: minimum assistance     Goal #4    Goal #5    Goal #6    Goal Comments: Goals established on 8/7/2019[NP.1]     Attribution Meza    NP. 1 - Sondra Lynch History of subdural hematoma    Anxiety disorder, unspecified type    Subdural hematoma [facility])      Past Medical History  Past Medical History:   Diagnosis Date   • Juvenile rheumatoid arthritis ([facility] [address]        Past Surgical History  Past Surgical History: patient has bilateral hands deformity from RA, bilateral  & wrist ROM limited moderately. Bilateral elbow extension limited, bilateral shoulders limited after initial range.     Lower extremity ROM is within functional limits except for the following: Assistive Device: Crutches  Pattern: (Walks on toes, crutches by resting wrist & hand on )  Stoop/Curb Assistance: Not tested  Comment : Above scores based on FIM definations    Skilled Therapy Provided: Evaluation completed, Patient was received limitations in independent bed mobility, transfers, and gait safety & skills. The patient is below baseline and would benefit from skilled inpatient PT to address the above deficits to assist patient in returning to prior to level of function.   DISCHARGE 8/1/19-8/3/19 for fall with SDH, therapy recommended acute rehab however patient refused and discharged home 8/3/19, patient with 3 falls at home between discharge 8/3/19 and readmission 8/6/19.  Therapy significant co-morbidities include dwarfism, juvenile -   Bathing (including washing, rinsing, drying)?: A Lot  -   Toileting, which includes using toilet, bedpan or urinal? : A Lot(simulated)  -   Putting on and taking off regular upper body clothing?: A Lot  -   Taking care of personal grooming such as Leonora Gander progress towards OT goals with improvements in balance, endurance, however patient remains below her baseline in these and other functional areas.  Patient would benefit from continued OT services during hospital stay to further address these deficits and a History related to current admission:[BW.1] Patient is[BW.3 61year old[BW.3] female admitted on 8/6/2019 from home for falls; patient had been admitted 8/1/19-8/3/19 for fall with SDH, therapy recommended acute rehab however patient refused and discharge herself if needed, but will let son assist if he is home; if she needs to wear LB clothing (underwear, pants, socks, shoes because leaving the home, son will assist. Son assists with showering and washing hair due to difficulty with shoulder range of motio Left Wrist extension unable to achieve active extension past neutral, reports baseline from RA  Right  unable to achieve full extension or flexion, reports baseline from RA  Left  unable to achieve full extension or flexion, reports baseline from R min assist for balance improving to close SBA; total assist required for donning B socks and underwear to waist, patient reports this is her baseline; standing via crutches and mod assist; donning underwear to waist via max assist, patient reports this is level of impairment often benefit from rehab at discharge. The patient is below her baseline and would benefit from continued skilled OT to address the activity limitations identified by the AM-PAC \"6 clicks\". Acute rehab is recommended at discharge.  Alt Attribution Meza    BW.1 - [facility], OT on 8/7/2019  3:21 PM  BW.2 - [facility], OT on 8/7/2019  3:24 PM  BW.3 - [facility], OT on 8/7/2019  3:44 PM  BW.4 - [facility], OT on 8/7/2019  3:30 PM                     Video Noel

## (undated) NOTE — ED AVS SNAPSHOT
Susanna Cross   MRN: [id number]    Department:  [facility] Emergency Department   Date of Visit:  9/15/2019           Disclosure     Insurance plans vary and the physician(s) referred by the ER may not be covered by your plan.  Please contact your tell this physician (or your personal doctor if your instructions are to return to your personal doctor) about any new or lasting problems. The primary care or specialist physician will see patients referred from the [facility] Emergency Department.  Jim Torres

## (undated) NOTE — ED AVS SNAPSHOT
Raisa Fry   MRN: [id number]    Department:  [facility] Emergency Department   Date of Visit:  1/23/2018           Disclosure     Insurance plans vary and the physician(s) referred by the ER may not be covered by your plan.  Please contact your tell this physician (or your personal doctor if your instructions are to return to your personal doctor) about any new or lasting problems. The primary care or specialist physician will see patients referred from the [facility] Emergency Department.  Sussy Pierre

## (undated) NOTE — ED AVS SNAPSHOT
Hutchins Ijeoma   MRN: [id number]    Department:  [facility] Emergency Department   Date of Visit:  10/8/2019           Disclosure     Insurance plans vary and the physician(s) referred by the ER may not be covered by your plan.  Please contact your tell this physician (or your personal doctor if your instructions are to return to your personal doctor) about any new or lasting problems. The primary care or specialist physician will see patients referred from the [facility] Emergency Department.  Salvadore Skiff

## (undated) NOTE — ED AVS SNAPSHOT
Kate Casillas   MRN: [id number]    Department:  [facility] Emergency Department   Date of Visit:  3/19/2019           Disclosure     Insurance plans vary and the physician(s) referred by the ER may not be covered by your plan.  Please contact your tell this physician (or your personal doctor if your instructions are to return to your personal doctor) about any new or lasting problems. The primary care or specialist physician will see patients referred from the [facility] Emergency Department.  David Page